# Patient Record
Sex: FEMALE | Race: WHITE | Employment: FULL TIME | URBAN - METROPOLITAN AREA
[De-identification: names, ages, dates, MRNs, and addresses within clinical notes are randomized per-mention and may not be internally consistent; named-entity substitution may affect disease eponyms.]

---

## 2017-04-05 PROBLEM — M25.361 INSTABILITY OF RIGHT KNEE JOINT: Status: ACTIVE | Noted: 2017-04-05

## 2021-04-09 LAB
ALBUMIN SERPL-MCNC: NORMAL G/DL
ALP BLD-CCNC: NORMAL U/L
ALT SERPL-CCNC: NORMAL U/L
ANION GAP SERPL CALCULATED.3IONS-SCNC: NORMAL MMOL/L
AST SERPL-CCNC: NORMAL U/L
AVERAGE GLUCOSE: 117
BILIRUB SERPL-MCNC: NORMAL MG/DL
BUN BLDV-MCNC: NORMAL MG/DL
CALCIUM SERPL-MCNC: NORMAL MG/DL
CHLORIDE BLD-SCNC: NORMAL MMOL/L
CHOLESTEROL, TOTAL: NORMAL
CHOLESTEROL/HDL RATIO: NORMAL
CO2: NORMAL
CREAT SERPL-MCNC: NORMAL MG/DL
GFR CALCULATED: NORMAL
GLUCOSE BLD-MCNC: NORMAL MG/DL
HBA1C MFR BLD: 5.7 %
HDLC SERPL-MCNC: NORMAL MG/DL
LDL CHOLESTEROL CALCULATED: NORMAL
NONHDLC SERPL-MCNC: NORMAL MG/DL
POTASSIUM SERPL-SCNC: NORMAL MMOL/L
SODIUM BLD-SCNC: NORMAL MMOL/L
TOTAL PROTEIN: NORMAL
TRIGL SERPL-MCNC: NORMAL MG/DL
VITAMIN D 25-HYDROXY: NORMAL
VITAMIN D2, 25 HYDROXY: NORMAL
VITAMIN D3,25 HYDROXY: NORMAL
VLDLC SERPL CALC-MCNC: NORMAL MG/DL

## 2021-05-12 ENCOUNTER — APPOINTMENT (OUTPATIENT)
Dept: GENERAL RADIOLOGY | Age: 61
DRG: 177 | End: 2021-05-12
Payer: COMMERCIAL

## 2021-05-12 LAB
ALBUMIN SERPL-MCNC: 2.8 G/DL (ref 3.5–5.2)
ALP BLD-CCNC: 53 U/L (ref 35–104)
ALT SERPL-CCNC: 55 U/L (ref 0–32)
ANION GAP SERPL CALCULATED.3IONS-SCNC: 14 MMOL/L (ref 7–16)
AST SERPL-CCNC: 60 U/L (ref 0–31)
BASOPHILS ABSOLUTE: 0.01 E9/L (ref 0–0.2)
BASOPHILS RELATIVE PERCENT: 0.1 % (ref 0–2)
BILIRUB SERPL-MCNC: 0.4 MG/DL (ref 0–1.2)
BUN BLDV-MCNC: 19 MG/DL (ref 6–23)
CALCIUM SERPL-MCNC: 8.7 MG/DL (ref 8.6–10.2)
CHLORIDE BLD-SCNC: 101 MMOL/L (ref 98–107)
CO2: 22 MMOL/L (ref 22–29)
CREAT SERPL-MCNC: 0.9 MG/DL (ref 0.5–1)
EOSINOPHILS ABSOLUTE: 0.02 E9/L (ref 0.05–0.5)
EOSINOPHILS RELATIVE PERCENT: 0.2 % (ref 0–6)
GFR AFRICAN AMERICAN: >60
GFR NON-AFRICAN AMERICAN: >60 ML/MIN/1.73
GLUCOSE BLD-MCNC: 116 MG/DL (ref 74–99)
HCT VFR BLD CALC: 42 % (ref 34–48)
HEMOGLOBIN: 13.4 G/DL (ref 11.5–15.5)
IMMATURE GRANULOCYTES #: 0.07 E9/L
IMMATURE GRANULOCYTES %: 0.8 % (ref 0–5)
INR BLD: 1.3
LYMPHOCYTES ABSOLUTE: 1.48 E9/L (ref 1.5–4)
LYMPHOCYTES RELATIVE PERCENT: 16.4 % (ref 20–42)
MCH RBC QN AUTO: 30 PG (ref 26–35)
MCHC RBC AUTO-ENTMCNC: 31.9 % (ref 32–34.5)
MCV RBC AUTO: 94 FL (ref 80–99.9)
MONOCYTES ABSOLUTE: 0.56 E9/L (ref 0.1–0.95)
MONOCYTES RELATIVE PERCENT: 6.2 % (ref 2–12)
NEUTROPHILS ABSOLUTE: 6.86 E9/L (ref 1.8–7.3)
NEUTROPHILS RELATIVE PERCENT: 76.3 % (ref 43–80)
PDW BLD-RTO: 13.2 FL (ref 11.5–15)
PLATELET # BLD: 323 E9/L (ref 130–450)
PMV BLD AUTO: 10.1 FL (ref 7–12)
POTASSIUM SERPL-SCNC: 3.8 MMOL/L (ref 3.5–5)
PRO-BNP: 49 PG/ML (ref 0–125)
PROTHROMBIN TIME: 14.7 SEC (ref 9.3–12.4)
RBC # BLD: 4.47 E12/L (ref 3.5–5.5)
SARS-COV-2, NAAT: DETECTED
SODIUM BLD-SCNC: 137 MMOL/L (ref 132–146)
TOTAL PROTEIN: 8 G/DL (ref 6.4–8.3)
TROPONIN: <0.01 NG/ML (ref 0–0.03)
WBC # BLD: 9 E9/L (ref 4.5–11.5)

## 2021-05-12 PROCEDURE — 87635 SARS-COV-2 COVID-19 AMP PRB: CPT

## 2021-05-12 PROCEDURE — 83880 ASSAY OF NATRIURETIC PEPTIDE: CPT

## 2021-05-12 PROCEDURE — 84484 ASSAY OF TROPONIN QUANT: CPT

## 2021-05-12 PROCEDURE — 85610 PROTHROMBIN TIME: CPT

## 2021-05-12 PROCEDURE — 85025 COMPLETE CBC W/AUTO DIFF WBC: CPT

## 2021-05-12 PROCEDURE — 80053 COMPREHEN METABOLIC PANEL: CPT

## 2021-05-12 PROCEDURE — 99284 EMERGENCY DEPT VISIT MOD MDM: CPT

## 2021-05-12 PROCEDURE — 71045 X-RAY EXAM CHEST 1 VIEW: CPT

## 2021-05-12 PROCEDURE — 93005 ELECTROCARDIOGRAM TRACING: CPT | Performed by: NURSE PRACTITIONER

## 2021-05-13 ENCOUNTER — HOSPITAL ENCOUNTER (INPATIENT)
Age: 61
LOS: 5 days | Discharge: HOME | DRG: 177 | End: 2021-05-18
Attending: EMERGENCY MEDICINE | Admitting: INTERNAL MEDICINE
Payer: COMMERCIAL

## 2021-05-13 ENCOUNTER — APPOINTMENT (OUTPATIENT)
Dept: CT IMAGING | Age: 61
DRG: 177 | End: 2021-05-13
Payer: COMMERCIAL

## 2021-05-13 DIAGNOSIS — U07.1 COVID-19: ICD-10-CM

## 2021-05-13 DIAGNOSIS — J96.01 ACUTE RESPIRATORY FAILURE WITH HYPOXIA (HCC): Primary | ICD-10-CM

## 2021-05-13 LAB
D DIMER: 913 NG/ML DDU
EKG ATRIAL RATE: 88 BPM
EKG P AXIS: 60 DEGREES
EKG P-R INTERVAL: 122 MS
EKG Q-T INTERVAL: 340 MS
EKG QRS DURATION: 72 MS
EKG QTC CALCULATION (BAZETT): 411 MS
EKG R AXIS: 63 DEGREES
EKG T AXIS: 17 DEGREES
EKG VENTRICULAR RATE: 88 BPM

## 2021-05-13 PROCEDURE — 2700000000 HC OXYGEN THERAPY PER DAY

## 2021-05-13 PROCEDURE — 2580000003 HC RX 258: Performed by: INTERNAL MEDICINE

## 2021-05-13 PROCEDURE — 2580000003 HC RX 258: Performed by: EMERGENCY MEDICINE

## 2021-05-13 PROCEDURE — 93010 ELECTROCARDIOGRAM REPORT: CPT | Performed by: INTERNAL MEDICINE

## 2021-05-13 PROCEDURE — 85378 FIBRIN DEGRADE SEMIQUANT: CPT

## 2021-05-13 PROCEDURE — 6360000002 HC RX W HCPCS: Performed by: EMERGENCY MEDICINE

## 2021-05-13 PROCEDURE — 2580000003 HC RX 258: Performed by: RADIOLOGY

## 2021-05-13 PROCEDURE — 2140000000 HC CCU INTERMEDIATE R&B

## 2021-05-13 PROCEDURE — 71275 CT ANGIOGRAPHY CHEST: CPT

## 2021-05-13 PROCEDURE — 6360000004 HC RX CONTRAST MEDICATION: Performed by: RADIOLOGY

## 2021-05-13 PROCEDURE — 6360000002 HC RX W HCPCS: Performed by: INTERNAL MEDICINE

## 2021-05-13 PROCEDURE — 96374 THER/PROPH/DIAG INJ IV PUSH: CPT

## 2021-05-13 RX ORDER — SODIUM CHLORIDE 0.9 % (FLUSH) 0.9 %
5-40 SYRINGE (ML) INJECTION EVERY 12 HOURS SCHEDULED
Status: DISCONTINUED | OUTPATIENT
Start: 2021-05-13 | End: 2021-05-18 | Stop reason: HOSPADM

## 2021-05-13 RX ORDER — DEXAMETHASONE SODIUM PHOSPHATE 10 MG/ML
6 INJECTION INTRAMUSCULAR; INTRAVENOUS ONCE
Status: COMPLETED | OUTPATIENT
Start: 2021-05-13 | End: 2021-05-13

## 2021-05-13 RX ORDER — SODIUM CHLORIDE 9 MG/ML
25 INJECTION, SOLUTION INTRAVENOUS PRN
Status: DISCONTINUED | OUTPATIENT
Start: 2021-05-13 | End: 2021-05-18 | Stop reason: HOSPADM

## 2021-05-13 RX ORDER — ACETAMINOPHEN 325 MG/1
650 TABLET ORAL EVERY 4 HOURS PRN
Status: DISCONTINUED | OUTPATIENT
Start: 2021-05-13 | End: 2021-05-18 | Stop reason: HOSPADM

## 2021-05-13 RX ORDER — SODIUM CHLORIDE 0.9 % (FLUSH) 0.9 %
5-40 SYRINGE (ML) INJECTION PRN
Status: DISCONTINUED | OUTPATIENT
Start: 2021-05-13 | End: 2021-05-18 | Stop reason: HOSPADM

## 2021-05-13 RX ORDER — 0.9 % SODIUM CHLORIDE 0.9 %
1000 INTRAVENOUS SOLUTION INTRAVENOUS ONCE
Status: COMPLETED | OUTPATIENT
Start: 2021-05-13 | End: 2021-05-13

## 2021-05-13 RX ORDER — SODIUM CHLORIDE 0.9 % (FLUSH) 0.9 %
10 SYRINGE (ML) INJECTION PRN
Status: COMPLETED | OUTPATIENT
Start: 2021-05-13 | End: 2021-05-13

## 2021-05-13 RX ADMIN — IOPAMIDOL 75 ML: 755 INJECTION, SOLUTION INTRAVENOUS at 03:08

## 2021-05-13 RX ADMIN — Medication 10 ML: at 03:08

## 2021-05-13 RX ADMIN — DEXAMETHASONE SODIUM PHOSPHATE 6 MG: 10 INJECTION INTRAMUSCULAR; INTRAVENOUS at 01:46

## 2021-05-13 RX ADMIN — ENOXAPARIN SODIUM 40 MG: 40 INJECTION SUBCUTANEOUS at 11:23

## 2021-05-13 RX ADMIN — SODIUM CHLORIDE 1000 ML: 9 INJECTION, SOLUTION INTRAVENOUS at 01:45

## 2021-05-13 RX ADMIN — SODIUM CHLORIDE, PRESERVATIVE FREE 10 ML: 5 INJECTION INTRAVENOUS at 21:34

## 2021-05-13 RX ADMIN — SODIUM CHLORIDE, PRESERVATIVE FREE 10 ML: 5 INJECTION INTRAVENOUS at 11:23

## 2021-05-13 NOTE — CARE COORDINATION
Call placed into the room due to COVID (+). Pt lives at home alone. No assistive devices. She tells me she does not have home O2 or a nebulizer. She stated she is independent with ADL's. Will have therapy eval pt to assist with any discharge needs. Pt will need ambulation testing prior to discharge. She is currently on 5 liters. Not a candidate for Remdesivir.  CM to follow    Quinten MILLERN, RN  [facility]  [phone]

## 2021-05-13 NOTE — H&P
Cordelia Corado is a 56 y.o. female presenting to the ED for Shortness of breath, beginning 1 week ago. The patient was diagnosed with COVID-19 on . Since then she has had increasing shortness of breath. Per the patient's daughter she has been sleeping a lot having decreased appetite and running intermittent fevers. Today they went to urgent care to get another test to see if she was able to go back to work but she was having increasing shortness of breath and on her oxygen saturations was noted to be 88% therefore she was sent to ER  for further evaluation. Denies any chest pain. No nausea vomiting diarrhea. No abdominal pain. Past Medical History:   Diagnosis Date    Arthritis of back     Hand pain     Knee pain, right     Pneumonia        Past Surgical History:   Procedure Laterality Date     SECTION      x1       History reviewed. No pertinent family history. Prior to Admission medications    Medication Sig Start Date End Date Taking? Authorizing Provider   Multiple Vitamins-Minerals (THERAPEUTIC MULTIVITAMIN-MINERALS) tablet Take 1 tablet by mouth daily    Historical Provider, MD   naproxen (NAPROSYN) 500 MG tablet Take 1 tablet by mouth 2 times daily as needed for Pain 3/25/17   TERE Holm - CNP        Allergies: Patient has no known allergies. Social History     Tobacco Use    Smoking status: Never Smoker    Smokeless tobacco: Never Used   Substance Use Topics    Alcohol use: No        Review of Systems:  Respiratory: pos.  Cough and SOB   Cardiovascular: negative for chest pain and dyspnea  Gastrointestinal: negative for abdominal pain, diarrhea, nausea and vomiting  Genitourinary:negative for dysuria and hematuria  Derm: negative for rash and skin lesion(s)  Neurological: negative for seizures and tremors  Endocrine: negative for diabetic symptoms including polydipsia and polyuria    Physical Exam:  Vitals:    21 0734   BP: 92/64   Pulse: 78   Resp: 18 Temp: 97.6 °F (36.4 °C)   SpO2: (!) 78%      Skin:  Warm and dry. No rash or bruises  HEENT:  PERRLA, EOMI  Neck:  No JVD, No thyromegaly, No carotid bruit  Cardiac:  RRR, No gallop or murmur  Lungs:  CTA, Normal percussion  Abdomen: Normal bowel sounds, no HSM, non-tender  Extremities:  No clubbing, edema or cyanosis  Neurological:  Moves all extremities    Labs:    CBC with Differential:    Lab Results   Component Value Date    WBC 9.0 05/12/2021    RBC 4.47 05/12/2021    HGB 13.4 05/12/2021    HCT 42.0 05/12/2021     05/12/2021    MCV 94.0 05/12/2021    MCH 30.0 05/12/2021    MCHC 31.9 05/12/2021    RDW 13.2 05/12/2021    LYMPHOPCT 16.4 05/12/2021    MONOPCT 6.2 05/12/2021    BASOPCT 0.1 05/12/2021    MONOSABS 0.56 05/12/2021    LYMPHSABS 1.48 05/12/2021    EOSABS 0.02 05/12/2021    BASOSABS 0.01 05/12/2021     CMP:    Lab Results   Component Value Date     05/12/2021    K 3.8 05/12/2021     05/12/2021    CO2 22 05/12/2021    BUN 19 05/12/2021    CREATININE 0.9 05/12/2021    GFRAA >60 05/12/2021    LABGLOM >60 05/12/2021    GLUCOSE 116 05/12/2021    GLUCOSE 90 06/07/2011    PROT 8.0 05/12/2021    LABALBU 2.8 05/12/2021    LABALBU 4.2 06/07/2011    CALCIUM 8.7 05/12/2021    BILITOT 0.4 05/12/2021    ALKPHOS 53 05/12/2021    AST 60 05/12/2021    ALT 55 05/12/2021      Imaging:CTA chest :  No evidence of pulmonary embolism.       Extensive scattered areas of patchy and ground-glass opacities are noted   throughout the bilateral lungs. These are nonspecific but could indicate an   infectious/inflammatory process.            Assessment and Plan:    Patient Active Problem List   Diagnosis    COVID-19 pneumonia  Hypoxia due to above

## 2021-05-14 PROCEDURE — 97535 SELF CARE MNGMENT TRAINING: CPT

## 2021-05-14 PROCEDURE — 2580000003 HC RX 258: Performed by: INTERNAL MEDICINE

## 2021-05-14 PROCEDURE — 97530 THERAPEUTIC ACTIVITIES: CPT

## 2021-05-14 PROCEDURE — 2700000000 HC OXYGEN THERAPY PER DAY

## 2021-05-14 PROCEDURE — 2140000000 HC CCU INTERMEDIATE R&B

## 2021-05-14 PROCEDURE — 6360000002 HC RX W HCPCS: Performed by: INTERNAL MEDICINE

## 2021-05-14 PROCEDURE — 97165 OT EVAL LOW COMPLEX 30 MIN: CPT

## 2021-05-14 RX ORDER — DEXAMETHASONE SODIUM PHOSPHATE 4 MG/ML
6 INJECTION, SOLUTION INTRA-ARTICULAR; INTRALESIONAL; INTRAMUSCULAR; INTRAVENOUS; SOFT TISSUE EVERY 24 HOURS
Status: COMPLETED | OUTPATIENT
Start: 2021-05-14 | End: 2021-05-17

## 2021-05-14 RX ADMIN — SODIUM CHLORIDE, PRESERVATIVE FREE 10 ML: 5 INJECTION INTRAVENOUS at 10:11

## 2021-05-14 RX ADMIN — ENOXAPARIN SODIUM 40 MG: 40 INJECTION SUBCUTANEOUS at 10:11

## 2021-05-14 RX ADMIN — DEXAMETHASONE SODIUM PHOSPHATE 6 MG: 4 INJECTION, SOLUTION INTRAMUSCULAR; INTRAVENOUS at 10:11

## 2021-05-14 RX ADMIN — SODIUM CHLORIDE, PRESERVATIVE FREE 10 ML: 5 INJECTION INTRAVENOUS at 21:18

## 2021-05-14 ASSESSMENT — PAIN SCALES - GENERAL: PAINLEVEL_OUTOF10: 0

## 2021-05-14 NOTE — PLAN OF CARE
Problem: Airway Clearance - Ineffective  Goal: Achieve or maintain patent airway  Outcome: Ongoing     Problem: Gas Exchange - Impaired  Goal: Absence of hypoxia  Outcome: Ongoing  Goal: Promote optimal lung function  Outcome: Ongoing     Problem: Breathing Pattern - Ineffective  Goal: Ability to achieve and maintain a regular respiratory rate  Outcome: Ongoing     Problem:  Body Temperature -  Risk of, Imbalanced  Goal: Ability to maintain a body temperature within defined limits  Outcome: Ongoing  Goal: Will regain or maintain usual level of consciousness  Outcome: Ongoing  Goal: Complications related to the disease process, condition or treatment will be avoided or minimized  Outcome: Ongoing     Problem: Isolation Precautions - Risk of Spread of Infection  Goal: Prevent transmission of infection  Outcome: Ongoing     Problem: Nutrition Deficits  Goal: Optimize nutritional status  Outcome: Ongoing     Problem: Risk for Fluid Volume Deficit  Goal: Maintain normal heart rhythm  Outcome: Ongoing  Goal: Maintain absence of muscle cramping  Outcome: Ongoing  Goal: Maintain normal serum potassium, sodium, calcium, phosphorus, and pH  Outcome: Ongoing     Problem: Loneliness or Risk for Loneliness  Goal: Demonstrate positive use of time alone when socialization is not possible  Outcome: Ongoing     Problem: Fatigue  Goal: Verbalize increase energy and improved vitality  Outcome: Ongoing     Problem: Patient Education: Go to Patient Education Activity  Goal: Patient/Family Education  Outcome: Ongoing     Problem: Pain:  Goal: Pain level will decrease  Description: Pain level will decrease  Outcome: Ongoing  Goal: Control of acute pain  Description: Control of acute pain  Outcome: Ongoing  Goal: Control of chronic pain  Description: Control of chronic pain  Outcome: Ongoing

## 2021-05-14 NOTE — PROGRESS NOTES
Occupational Therapy  OCCUPATIONAL THERAPY INITIAL EVALUATION      Date:2021  Patient Name: Yvon Reyna  MRN: [id number]  : 1960  Room: [address] [place] [address]    Referring Provider:  Hernan Chaidez MD    Evaluating OT: Leonela Perez OTR/L #[id number]     AM-PAC Daily Activity Raw Score:     Recommended Adaptive Equipment: TBD     Diagnosis: COVID     Patient presented to the hospital d/t SOB     Pertinent Medical History:    Past Medical History:   Diagnosis Date    Arthritis of back     Hand pain     Knee pain, right     Pneumonia       Precautions:  Falls, Droplet plus (COVID-19), O2, Burkin[place] Speaking     Home Living: Pt lives with son and grandson in a 2 story home with 10-12 OZ to enter (2HR). Full  Flight to second floor (2 HR) with 2nd floor set-up  Bathroom setup: [address] owned: none    Prior Level of Function: Indepndent with ADLs , Independent with IADLs; ambulated without AD  Driving: Yes  Occupation: car     Pain Level: Pt reports no pain    Cognition: A&O: 4/4; Follows 2 step directions   Memory:  Good   Sequencing:  F+   Problem solving:  Fair   Judgement/safety:  Fair     Functional Assessment:   Initial Eval Status  Date: 21 Treatment Status  Date: Short Term Goals = Long Term Goals  Treatment frequency: 1-4x/wk; PRN   Feeding Independent       Grooming Stand by Assist     Standing  Independent    UB Dressing Stand by Assist     Seated EOB  Independent    LB Dressing Stand by Assist   Independent    Bathing Stand by Assist  Independent    Toileting Stand by Assist   Independent    Bed Mobility  Supine to sit: Stand by Assist   Sit to supine: NT  Supine to sit:  Independent   Sit to supine: Independent    Functional Transfers Stand by Assist     Sit <>Stands without AD  Independent    Functional Mobility Stand by Assist     Functional ambulation without AD to chair in preparation to walk to bathroom  Independent    Balance Sitting:     Static:  Independent Dynamic:SBA  Standing: SBA  Sitting:     Dynamic:Independent  Standing: Independent   Activity Tolerance Fair  Good   Visual/  Perceptual Glasses: yes WFL                  Vitals:  5L/O2  Rest: O2 sat 94%  Seated eob: O2 sat 91%  1 min resaturate  to 93%  ADL: O2 sat 88%, HR 97 bpm  resat to 93% in 1 minute  Short ambulation task in room: O2 sat 89%  resat to 93% in 1 minute      Hand dominance: right     Strength ROM Additional Info:    RUE  4+/5  WFL good  and wfl FMC/dexterity noted during ADL tasks       LUE 4+/5  WFL good  and wfl FMC/dexterity noted during ADL tasks         [facility]   Sensation:  No c/o numbness or tingling  Tone: WFL   Edema: WFL                            Comments: Nursing clearance obtained prior to session. Upon arrival, patient supine in bed and agreeable to OT session. Therapist utilized  throughout session. Pt demonstrating F- understanding of education/techniques, requiring additional training / education. At end of session, patient seated in chair with call light and phone within reach, all lines intact. Pt would benefit from continued skilled OT to increase safety,  independence and quality of life. Treatment:  OT services provided: Therapist educated pt on role of OT and O2 sats. Therapist facilitated bed mobility (supine to sit, scooting), functional transfers from multiple surfaces (sit<>stands to/from bed/chair), graded functional activities (static/dynamic sitting at EOB/chair, static/dynamic standing, functional reaching) to address activity tolerance, and functional ambulation without AD (from bed to chair in preparation for ambulation to bathroom). Therapist provided minimal verbal cueing on hand placement, body mechanics, posture, energy conservation, breathing techniques, and safety. Therapist facilitated ADL retraining (UB/LB dressing (gown, don/doff sock) and multiple grooming task (including B wiping hands while standing).  Therapist educated pt on energy conservation, breathing techniques, and safety. Skilled monitoring of O2 sats, HR, and pt response throughout treatment (see above - cueing on pursed lip breathing / energy conservation techniques). Assessment of current deficits    Functional mobility [x]  ADLs [x] Strength [x]  Cognition []  Functional transfers  [x] IADLs [x] Safety Awareness [x]  Endurance [x]  Fine Motor Coordination [] Balance [x] Vision/perception [] Sensation []   Gross Motor Coordination [] ROM [] Delirium []                  Motor Control []      Plan of Care:  1-4 days/wk for 1-2 weeks PRN   Instruction/training on adapted ADL techniques and AE recommendations to increase functional independence within precautions  Training on energy conservation strategies/techniques to improve independence/tolerance for self-care routine  Functional transfer/mobility training/DME recommendations for increased independence, safety, and fall prevention  Patient/Family education to increase follow through with safety techniques and functional independence  Recommendation of environmental modifications for increased safety with functional transfers/mobility and ADLs  Therapeutic exercise to improve motor endurance, ROM, and functional strength for ADLs/functional transfers  Therapeutic activities to facilitate/challenge dynamic balance, stand tolerance, fine motor dexterity/in-hand manipulation for increased independence with ADLs      Rehab Potential: Good for established goals     Patient / Family Goal: not stated      Patient and/or family were instructed on functional diagnosis, prognosis/goals and OT plan of care. Demonstrated F- understanding.       AM-PAC Daily Activity Inpatient   How much help for putting on and taking off regular lower body clothing?: A Little  How much help for Bathing?: A Little  How much help for Toileting?: A Little  How much help for putting on and taking off regular upper body clothing?: A Little  How much help for taking care of personal grooming?: A Little  How much help for eating meals?: None  AM-PAC Inpatient Daily Activity Raw Score: 19  AM-PAC Inpatient ADL T-Scale Score : 40.22  ADL Inpatient CMS 0-100% Score: 42.8  ADL Inpatient CMS G-Code Modifier : CK          Eval Complexity: Low complexity evaluation performed due to occupational profile / review of medical history and assessment of functional performance skills. Time In: 0945  Time Out: 1020  Total Treatment Time: 23 minutes    Min Units   OT Eval Low 97165  X  1   OT Eval Medium 83536      OT Eval High 71837       OT Re-Eval [id number]       Therapeutic Ex 61658       Therapeutic Activities 01887  15  1   ADL/Self Care 25637  8  1   Orthotic Management 23904       Neuro Re-Ed 53529       Non-Billable Time          Evaluation Time includes thorough review of current medical information, gathering information on past medical history/social history and prior level of function, completion of standardized testing/informal observation of tasks, assessment of data and education on plan of care and goals.                Janae Browne, S/OT  Fransisco Sapp OTR/L #[id number]

## 2021-05-14 NOTE — PROGRESS NOTES
Admit Date: 5/13/2021    Subjective:     Feels better breathing easier     Objective:     No data found. I/O last 3 completed shifts: In: 480 [P.O.:480]  Out: -   No intake/output data recorded. HEENT: Normal  NECK: Thyroid normal. No carotid bruit. No lymphphadenopathy. CVS: RRR  RS: Clear. No wheeze. No rhonchi. ABD: Soft. Non tender. No mass. Normal BS.obese   EXT: No edema. Non tender. Pulses present. Skin intact.   NEURO: no focal deficit       Scheduled Meds:   sodium chloride flush  5-40 mL Intravenous 2 times per day    enoxaparin  40 mg Subcutaneous Daily     Continuous Infusions:   sodium chloride         CBC with Differential:    Lab Results   Component Value Date    WBC 9.0 05/12/2021    RBC 4.47 05/12/2021    HGB 13.4 05/12/2021    HCT 42.0 05/12/2021     05/12/2021    MCV 94.0 05/12/2021    MCH 30.0 05/12/2021    MCHC 31.9 05/12/2021    RDW 13.2 05/12/2021    LYMPHOPCT 16.4 05/12/2021    MONOPCT 6.2 05/12/2021    BASOPCT 0.1 05/12/2021    MONOSABS 0.56 05/12/2021    LYMPHSABS 1.48 05/12/2021    EOSABS 0.02 05/12/2021    BASOSABS 0.01 05/12/2021     CMP:    Lab Results   Component Value Date     05/12/2021    K 3.8 05/12/2021     05/12/2021    CO2 22 05/12/2021    BUN 19 05/12/2021    CREATININE 0.9 05/12/2021    GFRAA >60 05/12/2021    LABGLOM >60 05/12/2021    PROT 8.0 05/12/2021    LABALBU 2.8 05/12/2021    LABALBU 4.2 06/07/2011    CALCIUM 8.7 05/12/2021    BILITOT 0.4 05/12/2021    ALKPHOS 53 05/12/2021    AST 60 05/12/2021    ALT 55 05/12/2021     PT/INR:    Lab Results   Component Value Date    PROTIME 14.7 05/12/2021    PROTIME 11.8 06/07/2011    INR 1.3 05/12/2021       Assessment:     Active Problems:    COVID-19 pneumonia       Plan:   Continue remdesivir ,add steroid ,wean O2 as tolerated

## 2021-05-14 NOTE — PLAN OF CARE
Matilda Barajas RN  Outcome: Ongoing  Goal: Maintain absence of muscle cramping  5/14/2021 1154 by Liana López RN  Outcome: Met This Shift  5/14/2021 0033 by Matilda Barajas RN  Outcome: Ongoing  Goal: Maintain normal serum potassium, sodium, calcium, phosphorus, and pH  5/14/2021 1154 by Liana López RN  Outcome: Met This Shift  5/14/2021 0033 by Matilda Barajas RN  Outcome: Ongoing     Problem: Loneliness or Risk for Loneliness  Goal: Demonstrate positive use of time alone when socialization is not possible  5/14/2021 1154 by Liana López RN  Outcome: Met This Shift  5/14/2021 0033 by Matilda Barajas RN  Outcome: Ongoing     Problem: Fatigue  Goal: Verbalize increase energy and improved vitality  5/14/2021 1154 by Liana López RN  Outcome: Met This Shift  5/14/2021 0033 by Matilda Barajas RN  Outcome: Ongoing     Problem: Patient Education: Go to Patient Education Activity  Goal: Patient/Family Education  5/14/2021 1154 by Liana López RN  Outcome: Met This Shift  5/14/2021 0033 by Matilda Barajas RN  Outcome: Ongoing     Problem: Pain:  Goal: Pain level will decrease  Description: Pain level will decrease  5/14/2021 1154 by Liana López RN  Outcome: Met This Shift  5/14/2021 0033 by Matilda Barajas RN  Outcome: Ongoing  Goal: Control of acute pain  Description: Control of acute pain  5/14/2021 1154 by Liana López RN  Outcome: Met This Shift  5/14/2021 0033 by Matilda Barajas RN  Outcome: Ongoing  Goal: Control of chronic pain  Description: Control of chronic pain  5/14/2021 1154 by Liana López RN  Outcome: Met This Shift  5/14/2021 0033 by Matilda Barajas RN  Outcome: Ongoing  Goal: Patient's pain/discomfort is manageable  Description: Patient's pain/discomfort is manageable  Outcome: Met This Shift     Problem: Falls - Risk of:  Goal: Will remain free from falls  Description: Will remain free from falls  Outcome: Met This Shift  Goal: Absence of physical injury  Description: Absence of physical injury  Outcome: Met This Shift

## 2021-05-14 NOTE — PROGRESS NOTES
Pt O2 drops when she ambulates to the restroom, she takes her O2 off and drops to low 82%.   A 25ft NC was placed in pt room and pt was instructed to walk to bathroom while leaving O2 on

## 2021-05-15 PROCEDURE — 2580000003 HC RX 258: Performed by: INTERNAL MEDICINE

## 2021-05-15 PROCEDURE — 2580000003 HC RX 258: Performed by: NEUROMUSCULOSKELETAL MEDICINE & OMM

## 2021-05-15 PROCEDURE — 93005 ELECTROCARDIOGRAM TRACING: CPT | Performed by: INTERNAL MEDICINE

## 2021-05-15 PROCEDURE — 2140000000 HC CCU INTERMEDIATE R&B

## 2021-05-15 PROCEDURE — 6360000002 HC RX W HCPCS: Performed by: INTERNAL MEDICINE

## 2021-05-15 PROCEDURE — 2700000000 HC OXYGEN THERAPY PER DAY

## 2021-05-15 PROCEDURE — 6370000000 HC RX 637 (ALT 250 FOR IP): Performed by: INTERNAL MEDICINE

## 2021-05-15 RX ORDER — SODIUM CHLORIDE 9 MG/ML
INJECTION, SOLUTION INTRAVENOUS CONTINUOUS
Status: DISCONTINUED | OUTPATIENT
Start: 2021-05-15 | End: 2021-05-17

## 2021-05-15 RX ADMIN — SODIUM CHLORIDE, PRESERVATIVE FREE 10 ML: 5 INJECTION INTRAVENOUS at 09:04

## 2021-05-15 RX ADMIN — DEXAMETHASONE SODIUM PHOSPHATE 6 MG: 4 INJECTION, SOLUTION INTRAMUSCULAR; INTRAVENOUS at 09:04

## 2021-05-15 RX ADMIN — ENOXAPARIN SODIUM 40 MG: 40 INJECTION SUBCUTANEOUS at 09:03

## 2021-05-15 RX ADMIN — ACETAMINOPHEN 650 MG: 325 TABLET ORAL at 21:54

## 2021-05-15 RX ADMIN — SODIUM CHLORIDE: 9 INJECTION, SOLUTION INTRAVENOUS at 11:15

## 2021-05-15 ASSESSMENT — PAIN SCALES - GENERAL
PAINLEVEL_OUTOF10: 0
PAINLEVEL_OUTOF10: 0

## 2021-05-15 NOTE — PLAN OF CARE
pain/discomfort is manageable  Description: Patient's pain/discomfort is manageable  Outcome: Met This Shift     Problem: Falls - Risk of:  Goal: Will remain free from falls  Description: Will remain free from falls  Outcome: Met This Shift  Goal: Absence of physical injury  Description: Absence of physical injury  Outcome: Met This Shift     Problem: Infection:  Goal: Will remain free from infection  Description: Will remain free from infection  Outcome: Met This Shift     Problem: Safety:  Goal: Free from accidental physical injury  Description: Free from accidental physical injury  Outcome: Met This Shift  Goal: Free from intentional harm  Description: Free from intentional harm  Outcome: Met This Shift     Problem: Daily Care:  Goal: Daily care needs are met  Description: Daily care needs are met  Outcome: Met This Shift     Problem: Skin Integrity:  Goal: Skin integrity will stabilize  Description: Skin integrity will stabilize  Outcome: Met This Shift     Problem: Discharge Planning:  Goal: Patients continuum of care needs are met  Description: Patients continuum of care needs are met  Outcome: Met This Shift

## 2021-05-15 NOTE — PLAN OF CARE
Problem: Airway Clearance - Ineffective  Goal: Achieve or maintain patent airway  Outcome: Met This Shift     Problem: Breathing Pattern - Ineffective  Goal: Ability to achieve and maintain a regular respiratory rate  Outcome: Met This Shift     Problem:  Body Temperature -  Risk of, Imbalanced  Goal: Complications related to the disease process, condition or treatment will be avoided or minimized  Outcome: Met This Shift

## 2021-05-15 NOTE — PROGRESS NOTES
Results   Component Value Date    PROTIME 14.7 05/12/2021    PROTIME 11.8 06/07/2011    INR 1.3 05/12/2021     Last 3 Troponin:    Lab Results   Component Value Date    TROPONINI <0.01 05/12/2021    TROPONINI <0.01 08/02/2015    TROPONINI <0.01 06/07/2011     U/A:    Lab Results   Component Value Date    COLORU Yellow 01/03/2018    PHUR 5.0 01/03/2018    WBCUA 1-3 01/03/2018    RBCUA 1-3 01/03/2018    BACTERIA MODERATE 01/03/2018    CLARITYU SL CLOUDY 01/03/2018    SPECGRAV >=1.030 01/03/2018    LEUKOCYTESUR Negative 01/03/2018    UROBILINOGEN 0.2 01/03/2018    BILIRUBINUR Negative 01/03/2018    BLOODU SMALL 01/03/2018    GLUCOSEU Negative 01/03/2018     HgBA1c:  No components found for: HGBA1C  TSH:  No results found for: TSH  -----------------------------------------------------------------    Objective:   Vitals: /67   Pulse 63   Temp 97.3 °F (36.3 °C) (Infrared)   Resp 18   Ht 5' 8\" (1.727 m)   Wt 212 lb (96.2 kg)   SpO2 95%   BMI 32.23 kg/m²   General appearance: alert, appears stated age and cooperative  Skin: Skin color, texture, turgor normal. No rashes or lesions  HEENT: Head: Normal, normocephalic, atraumatic.   Neck: no adenopathy, no carotid bruit, no JVD, supple, symmetrical, trachea midline and thyroid not enlarged, symmetric, no tenderness/mass/nodules  Lungs: diminished breath sounds bibasilar  Heart: regular rate and rhythm, S1, S2 normal, no murmur, click, rub or gallop  Abdomen: soft, non-tender; bowel sounds normal; no masses,  no organomegaly  Extremities: extremities normal, atraumatic, no cyanosis or edema  Neurologic: Mental status: Alert, oriented, thought content appropriate        CTA PULMONARY W CONTRAST [[id number]] Collected: 05/13/21 0327      Order Status: Completed Updated: 05/13/21 0347     Narrative:       EXAMINATION:   CTA OF THE CHEST 5/13/2021 3:06 am     TECHNIQUE:   CTA of the chest was performed after the administration of intravenous   contrast.  Multiplanar reformatted images are provided for review.  MIP   images are provided for review. Dose modulation, iterative reconstruction,   and/or weight based adjustment of the mA/kV was utilized to reduce the   radiation dose to as low as reasonably achievable. COMPARISON:   None. HISTORY:   ORDERING SYSTEM PROVIDED HISTORY: sob   TECHNOLOGIST PROVIDED HISTORY:   Reason for exam:->sob   Decision Support Exception - unselect if not a suspected or confirmed   emergency medical condition->Emergency Medical Condition (MA)   What reading provider will be dictating this exam?->CRC     FINDINGS:   Pulmonary Arteries: Pulmonary arteries are adequately opacified for   evaluation.  No evidence of intraluminal filling defect to suggest pulmonary   embolism.  Main pulmonary artery is normal in caliber. Mediastinum: No evidence of mediastinal lymphadenopathy.  The heart and   pericardium demonstrate no acute abnormality.  There is no acute abnormality   of the thoracic aorta. Lungs/pleura: Extensive scattered areas of patchy and ground-glass opacities   are noted throughout the bilateral lungs.  These are nonspecific but could   indicate an atypical/viral pneumonia, inclusive of COVID-19.  No pulmonary   mass or jazzy consolidation.  No pleural effusion. Upper Abdomen: Small hiatal hernia.  The remainder of the visualized portions   of the upper abdomen is within normal limits. Soft Tissues/Bones: No acute bone or soft tissue abnormality.      Impression:       No evidence of pulmonary embolism. Extensive scattered areas of patchy and ground-glass opacities are noted   throughout the bilateral lungs. These are nonspecific but could indicate an   infectious/inflammatory process. Assessment:   Active Problems:    COVID-19  Resolved Problems:    * No resolved hospital problems. *    Plan:   1. Continue present care. 2. Will follow in alirio Francis, D.OStephani Pacheco AM  5/15/2021

## 2021-05-16 LAB
ALBUMIN SERPL-MCNC: 2.9 G/DL (ref 3.5–5.2)
ALP BLD-CCNC: 53 U/L (ref 35–104)
ALT SERPL-CCNC: 77 U/L (ref 0–32)
ANION GAP SERPL CALCULATED.3IONS-SCNC: 11 MMOL/L (ref 7–16)
AST SERPL-CCNC: 39 U/L (ref 0–31)
BASOPHILS ABSOLUTE: 0.02 E9/L (ref 0–0.2)
BASOPHILS RELATIVE PERCENT: 0.1 % (ref 0–2)
BILIRUB SERPL-MCNC: 0.3 MG/DL (ref 0–1.2)
BUN BLDV-MCNC: 15 MG/DL (ref 6–23)
C-REACTIVE PROTEIN: 2.1 MG/DL (ref 0–0.4)
CALCIUM SERPL-MCNC: 8.9 MG/DL (ref 8.6–10.2)
CHLORIDE BLD-SCNC: 106 MMOL/L (ref 98–107)
CK MB: 1 NG/ML (ref 0–4.3)
CO2: 23 MMOL/L (ref 22–29)
CREAT SERPL-MCNC: 0.7 MG/DL (ref 0.5–1)
D DIMER: 1512 NG/ML DDU
EKG ATRIAL RATE: 56 BPM
EKG P AXIS: 12 DEGREES
EKG P-R INTERVAL: 108 MS
EKG Q-T INTERVAL: 448 MS
EKG QRS DURATION: 88 MS
EKG QTC CALCULATION (BAZETT): 432 MS
EKG R AXIS: 75 DEGREES
EKG T AXIS: 49 DEGREES
EKG VENTRICULAR RATE: 56 BPM
EOSINOPHILS ABSOLUTE: 0.03 E9/L (ref 0.05–0.5)
EOSINOPHILS RELATIVE PERCENT: 0.2 % (ref 0–6)
FERRITIN: 960 NG/ML
FIBRINOGEN: >700 MG/DL (ref 225–540)
GFR AFRICAN AMERICAN: >60
GFR NON-AFRICAN AMERICAN: >60 ML/MIN/1.73
GLUCOSE BLD-MCNC: 159 MG/DL (ref 74–99)
HCT VFR BLD CALC: 39.5 % (ref 34–48)
HEMOGLOBIN: 12.8 G/DL (ref 11.5–15.5)
IMMATURE GRANULOCYTES #: 0.15 E9/L
IMMATURE GRANULOCYTES %: 1.1 % (ref 0–5)
LYMPHOCYTES ABSOLUTE: 1.14 E9/L (ref 1.5–4)
LYMPHOCYTES RELATIVE PERCENT: 8.1 % (ref 20–42)
MCH RBC QN AUTO: 30.4 PG (ref 26–35)
MCHC RBC AUTO-ENTMCNC: 32.4 % (ref 32–34.5)
MCV RBC AUTO: 93.8 FL (ref 80–99.9)
MONOCYTES ABSOLUTE: 0.61 E9/L (ref 0.1–0.95)
MONOCYTES RELATIVE PERCENT: 4.3 % (ref 2–12)
NEUTROPHILS ABSOLUTE: 12.18 E9/L (ref 1.8–7.3)
NEUTROPHILS RELATIVE PERCENT: 86.2 % (ref 43–80)
PDW BLD-RTO: 12.9 FL (ref 11.5–15)
PLATELET # BLD: 460 E9/L (ref 130–450)
PMV BLD AUTO: 10.6 FL (ref 7–12)
POTASSIUM SERPL-SCNC: 3.9 MMOL/L (ref 3.5–5)
PROCALCITONIN: 0.07 NG/ML (ref 0–0.08)
RBC # BLD: 4.21 E12/L (ref 3.5–5.5)
SODIUM BLD-SCNC: 140 MMOL/L (ref 132–146)
TOTAL CK: 31 U/L (ref 20–180)
TOTAL PROTEIN: 7 G/DL (ref 6.4–8.3)
TROPONIN: <0.01 NG/ML (ref 0–0.03)
WBC # BLD: 14.1 E9/L (ref 4.5–11.5)

## 2021-05-16 PROCEDURE — 99255 IP/OBS CONSLTJ NEW/EST HI 80: CPT | Performed by: INTERNAL MEDICINE

## 2021-05-16 PROCEDURE — 2580000003 HC RX 258: Performed by: INTERNAL MEDICINE

## 2021-05-16 PROCEDURE — 86140 C-REACTIVE PROTEIN: CPT

## 2021-05-16 PROCEDURE — 84145 PROCALCITONIN (PCT): CPT

## 2021-05-16 PROCEDURE — 80053 COMPREHEN METABOLIC PANEL: CPT

## 2021-05-16 PROCEDURE — 2140000000 HC CCU INTERMEDIATE R&B

## 2021-05-16 PROCEDURE — 2700000000 HC OXYGEN THERAPY PER DAY

## 2021-05-16 PROCEDURE — 82553 CREATINE MB FRACTION: CPT

## 2021-05-16 PROCEDURE — 36415 COLL VENOUS BLD VENIPUNCTURE: CPT

## 2021-05-16 PROCEDURE — 82550 ASSAY OF CK (CPK): CPT

## 2021-05-16 PROCEDURE — 82728 ASSAY OF FERRITIN: CPT

## 2021-05-16 PROCEDURE — 85378 FIBRIN DEGRADE SEMIQUANT: CPT

## 2021-05-16 PROCEDURE — 93010 ELECTROCARDIOGRAM REPORT: CPT | Performed by: INTERNAL MEDICINE

## 2021-05-16 PROCEDURE — 2580000003 HC RX 258: Performed by: NEUROMUSCULOSKELETAL MEDICINE & OMM

## 2021-05-16 PROCEDURE — 6360000002 HC RX W HCPCS: Performed by: INTERNAL MEDICINE

## 2021-05-16 PROCEDURE — 84484 ASSAY OF TROPONIN QUANT: CPT

## 2021-05-16 PROCEDURE — 85025 COMPLETE CBC W/AUTO DIFF WBC: CPT

## 2021-05-16 PROCEDURE — 85384 FIBRINOGEN ACTIVITY: CPT

## 2021-05-16 RX ADMIN — DEXAMETHASONE SODIUM PHOSPHATE 6 MG: 4 INJECTION, SOLUTION INTRAMUSCULAR; INTRAVENOUS at 09:19

## 2021-05-16 RX ADMIN — SODIUM CHLORIDE: 9 INJECTION, SOLUTION INTRAVENOUS at 18:36

## 2021-05-16 RX ADMIN — SODIUM CHLORIDE, PRESERVATIVE FREE 10 ML: 5 INJECTION INTRAVENOUS at 21:42

## 2021-05-16 RX ADMIN — ENOXAPARIN SODIUM 40 MG: 40 INJECTION SUBCUTANEOUS at 09:19

## 2021-05-16 RX ADMIN — SODIUM CHLORIDE: 9 INJECTION, SOLUTION INTRAVENOUS at 00:16

## 2021-05-16 ASSESSMENT — PAIN SCALES - GENERAL
PAINLEVEL_OUTOF10: 0
PAINLEVEL_OUTOF10: 0

## 2021-05-16 NOTE — PROGRESS NOTES
Pharmacy Documentation     Medication: Tocilizumab     Date: 5/16/21  Physician: Dr. Jarad Sandoval consulted to initiate Tocilizumab. Patient does not currently meet [facility] P&T approved Tocilizumab Criteria for Use to initiate medication. Toci must be started within 7 days of symptom onset or 2 days of hospital admission to meet criteria, patient is outside both windows (symptom onset 5/4; admitted 5/13). Pharmacy will sign off. Please re-consult if the clinical condition changes and patient meets criteria for initiation based on [facility] P&T approved Tocilizumab Criteria for Use.      Nickie BarclayD 5/16/2021 2:43 PM

## 2021-05-16 NOTE — PROGRESS NOTES
Dr. Ulysses Cliche on unit and notified that the pt had an episode on chest \"tightness\" last night as documented. Orders obtained and placed at this time.

## 2021-05-16 NOTE — PROGRESS NOTES
General Progress Note  5/16/2021 9:45 AM  Subjective:   Admit Date: 5/13/2021  PCP: Randon Romberg, MD  Interval History: blood pressures with IV fluids are better. Breathing is improving slightly. She still has oxygen drops with ambulation. Awake and alert, eating breakfast currently. Transient chest tightness last evening, seem to resolve with Tylenol? Diet: DIET GENERAL;  Pain is:None  Nausea:None  Bowel Movement/Flatus yes    Data:   Scheduled Meds:   dexamethasone  6 mg Intravenous Q24H    sodium chloride flush  5-40 mL Intravenous 2 times per day    enoxaparin  40 mg Subcutaneous Daily     Continuous Infusions:   sodium chloride 75 mL/hr at 05/16/21 0016    sodium chloride       PRN Meds:sodium chloride flush, sodium chloride, acetaminophen  I/O last 3 completed shifts: In: 65 [P.O.:840; I.V.:10]  Out: 0   No intake/output data recorded.     Intake/Output Summary (Last 24 hours) at 5/16/2021 0945  Last data filed at 5/16/2021 0612  Gross per 24 hour   Intake 540 ml   Output 0 ml   Net 540 ml       CBC with Differential:    Lab Results   Component Value Date    WBC 9.0 05/12/2021    RBC 4.47 05/12/2021    HGB 13.4 05/12/2021    HCT 42.0 05/12/2021     05/12/2021    MCV 94.0 05/12/2021    MCH 30.0 05/12/2021    MCHC 31.9 05/12/2021    RDW 13.2 05/12/2021    LYMPHOPCT 16.4 05/12/2021    MONOPCT 6.2 05/12/2021    BASOPCT 0.1 05/12/2021    MONOSABS 0.56 05/12/2021    LYMPHSABS 1.48 05/12/2021    EOSABS 0.02 05/12/2021    BASOSABS 0.01 05/12/2021     CMP:    Lab Results   Component Value Date     05/12/2021    K 3.8 05/12/2021     05/12/2021    CO2 22 05/12/2021    BUN 19 05/12/2021    CREATININE 0.9 05/12/2021    GFRAA >60 05/12/2021    LABGLOM >60 05/12/2021    GLUCOSE 116 05/12/2021    GLUCOSE 90 06/07/2011    PROT 8.0 05/12/2021    LABALBU 2.8 05/12/2021    LABALBU 4.2 06/07/2011    CALCIUM 8.7 05/12/2021    BILITOT 0.4 05/12/2021    ALKPHOS 53 05/12/2021    AST 60 05/12/2021 order labs and treat accordingly. 3. Consult Pulmonary. 4. Dr. Lili Brody to see in alirio Page Her DO Aragon D.O.  9:45 AM  5/16/2021

## 2021-05-16 NOTE — PLAN OF CARE
Problem: Airway Clearance - Ineffective  Goal: Achieve or maintain patent airway  5/16/2021 0316 by Shelly Lr RN  Outcome: Met This Shift  5/15/2021 1904 by Ranulfo Salazar RN  Outcome: Met This Shift     Problem: Gas Exchange - Impaired  Goal: Absence of hypoxia  5/16/2021 0316 by Shelly Lr RN  Outcome: Met This Shift  5/15/2021 1904 by Ranulfo Salazar RN  Outcome: Met This Shift  Goal: Promote optimal lung function  5/16/2021 0316 by Shelly Lr RN  Outcome: Met This Shift  5/15/2021 1904 by Ranulfo Salazar RN  Outcome: Met This Shift     Problem: Breathing Pattern - Ineffective  Goal: Ability to achieve and maintain a regular respiratory rate  5/16/2021 0316 by Shelly Lr RN  Outcome: Met This Shift  5/15/2021 1904 by Ranulfo Salazar RN  Outcome: Met This Shift     Problem:  Body Temperature -  Risk of, Imbalanced  Goal: Ability to maintain a body temperature within defined limits  5/16/2021 0316 by Shelly Lr RN  Outcome: Met This Shift  5/15/2021 1904 by Ranulfo Salazar RN  Outcome: Met This Shift  Goal: Will regain or maintain usual level of consciousness  5/16/2021 0316 by Shelly Lr RN  Outcome: Met This Shift  5/15/2021 1904 by Ranulfo Salazar RN  Outcome: Met This Shift  Goal: Complications related to the disease process, condition or treatment will be avoided or minimized  5/16/2021 0316 by Shelly Lr RN  Outcome: Met This Shift  5/15/2021 1904 by Ranulfo Salazar RN  Outcome: Met This Shift     Problem: Isolation Precautions - Risk of Spread of Infection  Goal: Prevent transmission of infection  5/16/2021 0316 by Shelly Lr RN  Outcome: Met This Shift  5/15/2021 1904 by Ranulfo Salazar RN  Outcome: Met This Shift     Problem: Nutrition Deficits  Goal: Optimize nutritional status  5/16/2021 0316 by Shelly Lr RN  Outcome: Met This Shift  5/15/2021 1904 by Ranulfo Salazar RN  Outcome: Met This Shift     Problem: Risk for Fluid Volume Deficit  Goal: Maintain normal heart rhythm  5/16/2021 0316 by Bety Roque RN  Outcome: Met This Shift  5/15/2021 1904 by Fidencio Zavaleta RN  Outcome: Met This Shift  Goal: Maintain absence of muscle cramping  5/16/2021 0316 by Bety Roque RN  Outcome: Met This Shift  5/15/2021 1904 by Fidencio Zavaleta RN  Outcome: Met This Shift  Goal: Maintain normal serum potassium, sodium, calcium, phosphorus, and pH  5/16/2021 0316 by Bety Roque RN  Outcome: Met This Shift  5/15/2021 1904 by Fidencio Zavaleta RN  Outcome: Met This Shift     Problem: Loneliness or Risk for Loneliness  Goal: Demonstrate positive use of time alone when socialization is not possible  5/16/2021 0316 by Bety Roque RN  Outcome: Met This Shift  5/15/2021 1904 by Fidencio Zavaleta RN  Outcome: Met This Shift     Problem: Fatigue  Goal: Verbalize increase energy and improved vitality  5/16/2021 0316 by Bety Roque RN  Outcome: Met This Shift  5/15/2021 1904 by Fidencio Zavaleta RN  Outcome: Met This Shift     Problem: Patient Education: Go to Patient Education Activity  Goal: Patient/Family Education  5/16/2021 0316 by Bety Roque RN  Outcome: Met This Shift  5/15/2021 1904 by Fidencio Zavaleta RN  Outcome: Met This Shift     Problem: Pain:  Goal: Pain level will decrease  Description: Pain level will decrease  5/16/2021 0316 by Bety Roque RN  Outcome: Met This Shift  5/15/2021 1904 by Fidencio Zavaleta RN  Outcome: Met This Shift  Goal: Control of acute pain  Description: Control of acute pain  5/16/2021 0316 by Bety Roque RN  Outcome: Met This Shift  5/15/2021 1904 by Fidencio Zavaleta RN  Outcome: Met This Shift  Goal: Control of chronic pain  Description: Control of chronic pain  5/16/2021 0316 by Bety Roque RN  Outcome: Met This Shift  5/15/2021 1904 by Fidencio Zavaleta RN  Outcome: Met This Shift  Goal: Patient's pain/discomfort is manageable  Description: Patient's pain/discomfort is manageable  5/16/2021 0316 by Toi Reddy GRABIEL Ragsdale  Outcome: Met This Shift  5/15/2021 1904 by Ansley Randolph RN  Outcome: Met This Shift     Problem: Falls - Risk of:  Goal: Will remain free from falls  Description: Will remain free from falls  5/16/2021 0316 by Anali Zheng RN  Outcome: Met This Shift  5/15/2021 1904 by Ansley Randolph RN  Outcome: Met This Shift  Goal: Absence of physical injury  Description: Absence of physical injury  5/16/2021 0316 by Anali Zheng RN  Outcome: Met This Shift  5/15/2021 1904 by Ansley Randolph RN  Outcome: Met This Shift     Problem: Infection:  Goal: Will remain free from infection  Description: Will remain free from infection  5/16/2021 0316 by Anali Zheng RN  Outcome: Met This Shift  5/15/2021 1904 by Ansley Randolph RN  Outcome: Met This Shift     Problem: Safety:  Goal: Free from accidental physical injury  Description: Free from accidental physical injury  5/16/2021 0316 by Anali Zheng RN  Outcome: Met This Shift  5/15/2021 1904 by Ansley Randolph RN  Outcome: Met This Shift  Goal: Free from intentional harm  Description: Free from intentional harm  5/16/2021 0316 by Anali Zheng RN  Outcome: Met This Shift  5/15/2021 1904 by Ansley Randolph RN  Outcome: Met This Shift     Problem: Daily Care:  Goal: Daily care needs are met  Description: Daily care needs are met  5/16/2021 0316 by Anali Zheng RN  Outcome: Met This Shift  5/15/2021 1904 by Ansley Randolph RN  Outcome: Met This Shift     Problem: Skin Integrity:  Goal: Skin integrity will stabilize  Description: Skin integrity will stabilize  5/16/2021 0316 by Anali Zheng RN  Outcome: Met This Shift  5/15/2021 1904 by Ansley Randolph RN  Outcome: Met This Shift     Problem: Discharge Planning:  Goal: Patients continuum of care needs are met  Description: Patients continuum of care needs are met  5/16/2021 0316 by Anali Zheng RN  Outcome: Met This Shift  5/15/2021 1904 by Ansley Randoplh RN  Outcome: Met This Shift

## 2021-05-16 NOTE — PROGRESS NOTES
Pt complains of 10/10 chest tightness, but no pain. Upon examining pt she looks to be in little to no distress, resting in bed, vital signs are stable. EKG preformed and tylenol given. Will continue to monitor.

## 2021-05-16 NOTE — CONSULTS
[facility]  Department of Internal Medicine  Division of Pulmonary, Critical Care and Sleep Medicine  Consult Note  Corinne General, DO, Ruel Round, MD, [facility]    Patient: Casandra Harrison  MRN: [id number]  : 1960    Encounter Time: 2:09 PM     Date of Admission: 2021 12:55 AM    Primary Care Physician: Tai Curiel MD    Reason for Consultation: COVID 19     HISTORY OF PRESENT ILLNESS : Casandra Harrison 56 y.o. female was seen in consultation regarding the above chief compliant. Casandra Harrison is a 56 y.o. female presenting to the ED for Shortness of breath, beginning 1 week ago. The complaint has been persistent, moderate in severity, and worsened by nothing. The patient was diagnosed with COVID-19 on . Since then she has had increasing shortness of breath. Per the patient's daughter she has been sleeping a lot having decreased appetite and running intermittent fevers. Today they went to urgent care to get another test to see if she was able to go back to work but she was having increasing shortness of breath and on her oxygen saturations was noted to be 88% therefore she was sent to our facility for further evaluation. Denies any chest pain. No nausea vomiting diarrhea. No abdominal pain. PAST MEDICAL HISTORY:  has a past medical history of Arthritis of back, Hand pain, Knee pain, right, and Pneumonia. SURGICAL HISTORY:  has a past surgical history that includes  section. SOCIAL HISTORY:  reports that she has never smoked. She has never used smokeless tobacco. She reports that she does not drink alcohol and does not use drugs. FAMILY  HISTORY: family history is not on file. MEDICATIONS:    Prior to Admission medications    Medication Sig Start Date End Date Taking?  Authorizing Provider   Multiple Vitamins-Minerals (THERAPEUTIC MULTIVITAMIN-MINERALS) tablet Take 1 tablet by mouth daily   Yes Historical Provider, MD   naproxen (NAPROSYN) 500 MG tablet Take 1 tablet by mouth 2 times daily as needed for Pain 3/25/17  Yes Veronica Smith Last, APRN - CNP       ALLERGIES: Patient has no known allergies. REVIEW OF SYSTEMS:  Otherwise negative if not reported or listed below  Constitutional:  No unanticipated weight loss. No change in sleep pattern or activity. No fevers, chills or rigors. Eyes:    No visual changes or diplopia. No scleral icterus. ENT:    No Headaches, hearing loss or vertigo. No mouth sores or sore throat. Cardiovascular:  + chest discomfort, palpitations. Respiratory:  + cough, No wheezing      No sputum production. No hemoptysis, pleuritic pain. Gastrointestinal:  No abdominal pain, appetite loss, blood in stools. No hematemesis  Genitourinary:  No dysuria, trouble voiding or hematuria. No nocturia. Musculoskeletal:   No weakness or joint complaints. Integumentary: No rashes or pruritis. Neurological:  No headache, numbness or tingling. Psychiatric:   No effect on mood, memory, mentation, or behavior. No anxiety or depression. Endocrine:   No excessive thirst, fluid intake, or urination. No tremor. Hematologic: No abnormal bruising or bleeding. Lymphatic:  No swollen lymph nodes. Immunologic:  No hives or hx of anaphaxsis.       OBJECTIVE:     PHYSICAL EXAM:   VITALS:   Vitals:    05/15/21 1300 05/15/21 2130 05/16/21 0430 05/16/21 0910   BP: 112/62 113/62 116/68 102/62   Pulse: 79 63 62 100   Resp: 20 18 18 18   Temp: 98.7 °F (37.1 °C) 97.5 °F (36.4 °C) 98.1 °F (36.7 °C) 96.7 °F (35.9 °C)   TempSrc: Infrared Infrared Infrared Oral   SpO2: 94% 93% 93% 92%   Weight:       Height:            Intake/Output Summary (Last 24 hours) at 5/16/2021 1409  Last data filed at 5/16/2021 0612  Gross per 24 hour   Intake 540 ml   Output --   Net 540 ml        CONSTITUTIONAL:   A&O x 3, NAD  SKIN:     No rash, No suspicious lesions or skin discoloration  HEENT:     EOMI, MMM, No thrush  NECK:    No bruits, No JVP apprechiated  CV:      Sinus,  No murmur, No rubs, No gallops  PULMONARY:   Couse BS,  No Wheezing, No Rales, No Rhonchi      No noted egophony  ABDOMEN:     Soft, non-tender. BS normal. No R/R/G  EXT:    No deformities . No clubbing. No lower extremity edema, No venous stasis  PULSE:   Appears equal and palpable.   PSYCHIATRIC:  Seems appropriate, No acute psycosis  MS:    No fractures, No gross weakness  NEUROLOGIC:   The clinical assessment is non-focal     DATA: IMAGING & TESTING:     LABORATORY TESTS:    CBC:   Lab Results   Component Value Date    WBC 14.1 05/16/2021    RBC 4.21 05/16/2021    HGB 12.8 05/16/2021    HCT 39.5 05/16/2021    MCV 93.8 05/16/2021    MCH 30.4 05/16/2021    MCHC 32.4 05/16/2021    RDW 12.9 05/16/2021     05/16/2021    MPV 10.6 05/16/2021     CMP:    Lab Results   Component Value Date     05/16/2021    K 3.9 05/16/2021     05/16/2021    CO2 23 05/16/2021    BUN 15 05/16/2021    CREATININE 0.7 05/16/2021    GFRAA >60 05/16/2021    LABGLOM >60 05/16/2021    GLUCOSE 159 05/16/2021    GLUCOSE 90 06/07/2011    PROT 7.0 05/16/2021    LABALBU 2.9 05/16/2021    LABALBU 4.2 06/07/2011    CALCIUM 8.9 05/16/2021    BILITOT 0.3 05/16/2021    ALKPHOS 53 05/16/2021    AST 39 05/16/2021    ALT 77 05/16/2021     Magnesium:    Lab Results   Component Value Date    MG 2.2 08/02/2015     TSH:  No results found for: TSH  FERRITIN:    Lab Results   Component Value Date    FERRITIN 960 05/16/2021     Fibrinogen Level:  No components found for: FIB     PRO-BNP:   Lab Results   Component Value Date    PROBNP 49 05/12/2021    PROBNP 37 08/02/2015      ABGs: No results found for: PH, PO2, PCO2  Hemoglobin A1C: No components found for: HGBA1C    IMAGING:  Imaging tests were completed and reviewed and discussed radiology and care team involved and reveals   XR CHEST PORTABLE    Result MPH, Suri Gutierrez  Professor of Internal Medicine  Pulmonary, Critical Care and Sleep Medicine

## 2021-05-17 PROCEDURE — 2700000000 HC OXYGEN THERAPY PER DAY

## 2021-05-17 PROCEDURE — 2140000000 HC CCU INTERMEDIATE R&B

## 2021-05-17 PROCEDURE — 6370000000 HC RX 637 (ALT 250 FOR IP): Performed by: INTERNAL MEDICINE

## 2021-05-17 PROCEDURE — 97530 THERAPEUTIC ACTIVITIES: CPT

## 2021-05-17 PROCEDURE — 2580000003 HC RX 258: Performed by: INTERNAL MEDICINE

## 2021-05-17 PROCEDURE — 97162 PT EVAL MOD COMPLEX 30 MIN: CPT

## 2021-05-17 PROCEDURE — 6360000002 HC RX W HCPCS: Performed by: INTERNAL MEDICINE

## 2021-05-17 RX ORDER — ASCORBIC ACID 500 MG
500 TABLET ORAL DAILY
Status: DISCONTINUED | OUTPATIENT
Start: 2021-05-17 | End: 2021-05-18 | Stop reason: HOSPADM

## 2021-05-17 RX ORDER — ZINC SULFATE 50(220)MG
50 CAPSULE ORAL DAILY
Status: DISCONTINUED | OUTPATIENT
Start: 2021-05-17 | End: 2021-05-18 | Stop reason: HOSPADM

## 2021-05-17 RX ORDER — DEXAMETHASONE 4 MG/1
6 TABLET ORAL DAILY
Status: DISCONTINUED | OUTPATIENT
Start: 2021-05-18 | End: 2021-05-18 | Stop reason: HOSPADM

## 2021-05-17 RX ADMIN — SODIUM CHLORIDE, PRESERVATIVE FREE 10 ML: 5 INJECTION INTRAVENOUS at 20:19

## 2021-05-17 RX ADMIN — SODIUM CHLORIDE, PRESERVATIVE FREE 10 ML: 5 INJECTION INTRAVENOUS at 10:04

## 2021-05-17 RX ADMIN — DEXAMETHASONE SODIUM PHOSPHATE 6 MG: 4 INJECTION, SOLUTION INTRAMUSCULAR; INTRAVENOUS at 10:03

## 2021-05-17 RX ADMIN — ENOXAPARIN SODIUM 40 MG: 40 INJECTION SUBCUTANEOUS at 10:04

## 2021-05-17 RX ADMIN — OXYCODONE HYDROCHLORIDE AND ACETAMINOPHEN 500 MG: 500 TABLET ORAL at 15:58

## 2021-05-17 RX ADMIN — Medication 50 MG: at 15:58

## 2021-05-17 NOTE — PLAN OF CARE
Problem: Airway Clearance - Ineffective  Goal: Achieve or maintain patent airway  Outcome: Met This Shift     Problem: Gas Exchange - Impaired  Goal: Absence of hypoxia  Outcome: Met This Shift  Goal: Promote optimal lung function  Outcome: Met This Shift     Problem: Breathing Pattern - Ineffective  Goal: Ability to achieve and maintain a regular respiratory rate  Outcome: Met This Shift     Problem:  Body Temperature -  Risk of, Imbalanced  Goal: Ability to maintain a body temperature within defined limits  Outcome: Met This Shift  Goal: Will regain or maintain usual level of consciousness  Outcome: Met This Shift  Goal: Complications related to the disease process, condition or treatment will be avoided or minimized  Outcome: Met This Shift     Problem: Isolation Precautions - Risk of Spread of Infection  Goal: Prevent transmission of infection  Outcome: Met This Shift     Problem: Nutrition Deficits  Goal: Optimize nutritional status  Outcome: Met This Shift     Problem: Risk for Fluid Volume Deficit  Goal: Maintain normal heart rhythm  Outcome: Met This Shift  Goal: Maintain absence of muscle cramping  Outcome: Met This Shift  Goal: Maintain normal serum potassium, sodium, calcium, phosphorus, and pH  Outcome: Met This Shift     Problem: Loneliness or Risk for Loneliness  Goal: Demonstrate positive use of time alone when socialization is not possible  Outcome: Met This Shift     Problem: Fatigue  Goal: Verbalize increase energy and improved vitality  Outcome: Met This Shift     Problem: Patient Education: Go to Patient Education Activity  Goal: Patient/Family Education  Outcome: Met This Shift     Problem: Pain:  Goal: Pain level will decrease  Description: Pain level will decrease  Outcome: Met This Shift  Goal: Control of acute pain  Description: Control of acute pain  Outcome: Met This Shift  Goal: Control of chronic pain  Description: Control of chronic pain  Outcome: Met This Shift  Goal: Patient's

## 2021-05-17 NOTE — CARE COORDINATION
SOCIAL WORK/DISCHARGE PLANNING;  Care coordination update; Pt down to 4L02. Pt given last dose of IV decadron and being switched to oral decadron. PT/OT ordered. Pt seen by OT([facility]-19). Called rehab for P.T eval. Will need pulse ox testing prior to discharge. Pt per prior note does not have a DME preference. Plan is for discharge home with family support.   [facility]  [phone]

## 2021-05-18 VITALS
DIASTOLIC BLOOD PRESSURE: 62 MMHG | OXYGEN SATURATION: 96 % | BODY MASS INDEX: 32.13 KG/M2 | HEIGHT: 68 IN | RESPIRATION RATE: 18 BRPM | TEMPERATURE: 98 F | HEART RATE: 75 BPM | WEIGHT: 212 LBS | SYSTOLIC BLOOD PRESSURE: 114 MMHG

## 2021-05-18 LAB — C-REACTIVE PROTEIN: 1.2 MG/DL (ref 0–0.4)

## 2021-05-18 PROCEDURE — 36415 COLL VENOUS BLD VENIPUNCTURE: CPT

## 2021-05-18 PROCEDURE — 6370000000 HC RX 637 (ALT 250 FOR IP): Performed by: INTERNAL MEDICINE

## 2021-05-18 PROCEDURE — 86140 C-REACTIVE PROTEIN: CPT

## 2021-05-18 PROCEDURE — 2700000000 HC OXYGEN THERAPY PER DAY

## 2021-05-18 PROCEDURE — 2580000003 HC RX 258: Performed by: INTERNAL MEDICINE

## 2021-05-18 PROCEDURE — 6360000002 HC RX W HCPCS: Performed by: INTERNAL MEDICINE

## 2021-05-18 RX ORDER — ZINC SULFATE 50(220)MG
50 CAPSULE ORAL DAILY
Qty: 30 CAPSULE | Refills: 3 | COMMUNITY
Start: 2021-05-18 | End: 2022-10-21

## 2021-05-18 RX ORDER — DEXAMETHASONE 6 MG/1
6 TABLET ORAL DAILY
Qty: 10 TABLET | Refills: 0 | Status: SHIPPED | OUTPATIENT
Start: 2021-05-18 | End: 2021-05-28

## 2021-05-18 RX ORDER — ASCORBIC ACID 500 MG
500 TABLET ORAL DAILY
Qty: 30 TABLET | Refills: 3 | Status: SHIPPED | OUTPATIENT
Start: 2021-05-18 | End: 2022-10-21

## 2021-05-18 RX ADMIN — SODIUM CHLORIDE, PRESERVATIVE FREE 10 ML: 5 INJECTION INTRAVENOUS at 08:50

## 2021-05-18 RX ADMIN — OXYCODONE HYDROCHLORIDE AND ACETAMINOPHEN 500 MG: 500 TABLET ORAL at 08:49

## 2021-05-18 RX ADMIN — ENOXAPARIN SODIUM 40 MG: 40 INJECTION SUBCUTANEOUS at 08:49

## 2021-05-18 RX ADMIN — DEXAMETHASONE 6 MG: 4 TABLET ORAL at 08:49

## 2021-05-18 RX ADMIN — Medication 50 MG: at 08:49

## 2021-05-18 ASSESSMENT — PAIN SCALES - GENERAL
PAINLEVEL_OUTOF10: 0
PAINLEVEL_OUTOF10: 0

## 2021-05-18 NOTE — PROGRESS NOTES
Dr. Lobo Jarvis notified that patient requires an order for home oxygen 2 liters.    Jose Gilbert RN

## 2021-05-18 NOTE — PLAN OF CARE
Problem: Airway Clearance - Ineffective  Goal: Achieve or maintain patent airway  Outcome: Met This Shift     Problem: Gas Exchange - Impaired  Goal: Absence of hypoxia  Outcome: Met This Shift  Goal: Promote optimal lung function  Outcome: Met This Shift     Problem: Breathing Pattern - Ineffective  Goal: Ability to achieve and maintain a regular respiratory rate  Outcome: Met This Shift     Problem:  Body Temperature -  Risk of, Imbalanced  Goal: Ability to maintain a body temperature within defined limits  Outcome: Met This Shift  Goal: Will regain or maintain usual level of consciousness  Outcome: Met This Shift     Problem: Isolation Precautions - Risk of Spread of Infection  Goal: Prevent transmission of infection  Outcome: Met This Shift     Problem: Risk for Fluid Volume Deficit  Goal: Maintain normal heart rhythm  Outcome: Met This Shift  Goal: Maintain absence of muscle cramping  Outcome: Met This Shift     Problem: Pain:  Goal: Pain level will decrease  Description: Pain level will decrease  Outcome: Met This Shift  Goal: Control of acute pain  Description: Control of acute pain  Outcome: Met This Shift     Problem: Falls - Risk of:  Goal: Will remain free from falls  Description: Will remain free from falls  Outcome: Met This Shift  Goal: Absence of physical injury  Description: Absence of physical injury  Outcome: Met This Shift     Problem: Safety:  Goal: Free from accidental physical injury  Description: Free from accidental physical injury  Outcome: Met This Shift  Goal: Free from intentional harm  Description: Free from intentional harm  Outcome: Met This Shift     Problem: Skin Integrity:  Goal: Skin integrity will stabilize  Description: Skin integrity will stabilize  Outcome: Met This Shift

## 2021-05-18 NOTE — PROGRESS NOTES
PULSE OX ON ROOM AIR SITTING _91___%   PULSE OX ON ROOM AIR AMBULATING _86__%   PULSE OX ON _2__LITERS SITTING RECOVERY _94__%   PULSE OX ON __2_LITERS AMBULATING RECOVERY__91_%   PULSE OX ON __2_ LITERS AMBULATING RECOVERY ___  Patient reports feeling short of breath while ambulating, with and without oxygen, and for a short time after sitting with O2.

## 2021-05-18 NOTE — PROGRESS NOTES
Admit Date: 5/13/2021    Subjective:     Feels good no complaint still on O2 NC     Objective:     Patient Vitals for the past 8 hrs:   BP Temp Temp src Pulse Resp SpO2   05/18/21 0015 (!) 102/58 97.8 °F (36.6 °C) Oral 80 18 93 %     I/O last 3 completed shifts: In: 1000 [P.O.:990; I.V.:10]  Out: 1200 [Urine:1200]  No intake/output data recorded. HEENT: Normal  NECK: Thyroid normal. No carotid bruit. No lymphphadenopathy. CVS: RRR  RS: Clear. No wheeze. No rhonchi. Good airflow bilaterally. ABD: Soft. Non tender. No mass. Normal BS. EXT: No edema. Non tender. Pulses present. Skin intact.   NEURO:no focal deficit       Scheduled Meds:   dexamethasone  6 mg Oral Daily    zinc sulfate  50 mg Oral Daily    ascorbic acid  500 mg Oral Daily    sodium chloride flush  5-40 mL Intravenous 2 times per day    enoxaparin  40 mg Subcutaneous Daily     Continuous Infusions:   sodium chloride         CBC with Differential:    Lab Results   Component Value Date    WBC 14.1 05/16/2021    RBC 4.21 05/16/2021    HGB 12.8 05/16/2021    HCT 39.5 05/16/2021     05/16/2021    MCV 93.8 05/16/2021    MCH 30.4 05/16/2021    MCHC 32.4 05/16/2021    RDW 12.9 05/16/2021    LYMPHOPCT 8.1 05/16/2021    MONOPCT 4.3 05/16/2021    BASOPCT 0.1 05/16/2021    MONOSABS 0.61 05/16/2021    LYMPHSABS 1.14 05/16/2021    EOSABS 0.03 05/16/2021    BASOSABS 0.02 05/16/2021     CMP:    Lab Results   Component Value Date     05/16/2021    K 3.9 05/16/2021     05/16/2021    CO2 23 05/16/2021    BUN 15 05/16/2021    CREATININE 0.7 05/16/2021    GFRAA >60 05/16/2021    LABGLOM >60 05/16/2021    PROT 7.0 05/16/2021    LABALBU 2.9 05/16/2021    LABALBU 4.2 06/07/2011    CALCIUM 8.9 05/16/2021    BILITOT 0.3 05/16/2021    ALKPHOS 53 05/16/2021    AST 39 05/16/2021    ALT 77 05/16/2021     PT/INR:    Lab Results   Component Value Date    PROTIME 14.7 05/12/2021    PROTIME 11.8 06/07/2011    INR 1.3 05/12/2021       Assessment:     Active

## 2021-05-18 NOTE — PROGRESS NOTES
Message sent to Dr. Carmelo Magdaleno via perfect [employer] regarding plan to discharge patient today. Patient needs two liters to go home. Question if patient is ok for discharge. Dr. Clark Lyle back and states ok.    Alia Holliday RN

## 2021-05-18 NOTE — ADT AUTH CERT
Viral Illness, Acute - Care Day 5 (5/17/2021) by Uri Toth RN       Review Status Review Entered   Completed 5/18/2021 12:02      Criteria Review      Care Day: 5 Care Date: 5/17/2021 Level of Care: Intermediate Care    Guideline Day 2    Clinical Status    ( ) * Hypotension absent    5/18/2021 12:02 PM EDT by Qing Petite      98/50    (X) * No requirement for mechanical ventilation    ( ) * Oxygenation at baseline or improved    5/18/2021 12:02 PM EDT by Qing Petite      3.5 L nc    (X) * Mental status at baseline    5/18/2021 12:02 PM EDT by Qing Petsteven      wdl    Activity    ( ) Advance activity as tolerated    5/18/2021 12:02 PM EDT by Qing Petite      cont bedrest    Routes    (X) * Oral hydration    5/18/2021 12:02 PM EDT by Qing Petite      cont same    (X) Oral or IV medications    5/18/2021 12:02 PM EDT by Qing Petite      cont current meds plus  vit C 500 mg qd po  zincate 50 mg qd po    (X) Usual diet    5/18/2021 12:02 PM EDT by Qing Petite      cont same    Interventions    (X) Possible isolation    (X) Pulse oximetry    5/18/2021 12:02 PM EDT by Qing Petite      spot checks   94%    (X) Possible oxygen    5/18/2021 12:02 PM EDT by Qing Petite      3.5 L nc    * Milestone   Additional Notes   5/17/2021      97.7 18 75 98/20 94% 3.5 L nc      Cont ivf @ 75/hr       Will need home O2 eval prior to discharge      ============================================================      Per pulmo       Assessment:    1. COVID 19 pneumonia   2. Acute Respiratory Failure   3. [place]au[place] speaking   4. DM    5. HTN   6. DJD   7. Obesity                Plan:    1. Decadron 10 mg daily for 10 days   2. Bronchodilators   3. Wean oxygen   4. Check Inflammatory markers   5. Add TOCI if able   6. Monitor    7. Self Proning   8.  Vitamin C and Zinc orally    ______________________________________________________________      Per IM       Assessment:       Active Problems:     COVID-19   Hypoxic respiratory failure            Plan:   Continue same             Viral Illness, Acute - Care Day 3 (5/15/2021) by Paul Solis RN       Review Status Review Entered   Completed 5/18/2021 11:58      Criteria Review      Care Day: 3 Care Date: 5/15/2021 Level of Care: Intermediate Care    Guideline Day 2    Clinical Status    ( ) * Hypotension absent    5/18/2021 11:58 AM EDT by Ranjit Yadav      88/58    (X) * No requirement for mechanical ventilation    ( ) * Oxygenation at baseline or improved    5/18/2021 11:58 AM EDT by Ranjit Yadav      5 L nc    (X) * Mental status at baseline    5/18/2021 11:58 AM EDT by Ranjit Yadav      WDL    Activity    ( ) Advance activity as tolerated    5/18/2021 11:58 AM EDT by Ranjit Yadav      strict bedrest    Routes    (X) * Oral hydration    5/18/2021 11:58 AM EDT by Ranjit Yadav      cont same    (X) Oral or IV medications    5/18/2021 11:58 AM EDT by Ranjit Yadav      cont current meds plus  tylenol 650 m q6 prn x1 po    (X) Usual diet    5/18/2021 11:58 AM EDT by Ranjit Yadav      general diet    Interventions    (X) Possible isolation    5/18/2021 11:58 AM EDT by Ranjit Yadav      cont    (X) Pulse oximetry    5/18/2021 11:58 AM EDT by Ranjit Yadav      93-95%    (X) Possible oxygen    5/18/2021 11:58 AM EDT by Ranjit Yadav      4.5-5 l nc    * Milestone   Additional Notes   5/15/2021      96.8 20 96 88/58 94% 4.5 L nc      Ekg   Sinus bradycardia with short AK   Otherwise normal ECG   When compared with ECG of 12-MAY-2021 20:31,   Significant changes have occurred      Iv ns @ 75 ml/hr   =========================================================      Per IM Assessment:   Active Problems:     COVID-19   Resolved Problems:     * No resolved hospital problems. *       Plan:   1. Continue present care. 2. Will follow in a.m. [place]

## 2021-05-18 NOTE — CARE COORDINATION
SOCIAL WORK/DISCHARGE PLANNING;  Spoke with pt. She is for discharge home today with 02. Made referral to [facility] for 02. Pt on 2L 02. Awaiting physician order. [facility]  [phone]    Home 02 order in chart. Notified Melissa Galindo of [facility].   [facility]  [phone]

## 2021-05-19 ENCOUNTER — CARE COORDINATION (OUTPATIENT)
Dept: CASE MANAGEMENT | Age: 61
End: 2021-05-19

## 2021-05-19 NOTE — DISCHARGE SUMMARY
Discharge Summary    Date: 5/19/2021  Patient Name: Carlene Patton YOB: 1960 Age: 56 y.o. Admit Date: 5/13/2021  Discharge Date: 5/18/2021  Discharge Condition: Stable    Admission Diagnosis  COVID-19 (U07.1)     Discharge Diagnosis  Active Problems: COVID-19Resolved Problems: * No resolved hospital problems. [facility] Stay  Narrative of Hospital Course:  Admitted from ER with increase SOB known covid pos. Week earlier was out of remdesivir treatment given steroid O2 supplement pulmonary hygiene seen by pulmonary slowly improved stabilized discharged home on O2 NC     Consultants:  IP CONSULT TO [address] CONSULT TO PULMONOLOGYIP CONSULT TO PHARMACY    Surgeries/procedures Performed:       Treatments:           Discharge Plan/Disposition:  Home    Hospital/Incidental Findings Requiring Follow Up:    Patient Instructions:    Diet: Regular Diet    Activity:Activity as Tolerated  For number of days (if applicable): Other Instructions:    Provider Follow-Up:   No follow-ups on file.      Significant Diagnostic Studies:    Recent Labs:  Admission on 05/13/2021, Discharged on 05/18/2021WBC                                         Date: 05/12/2021Value: 9.0         Ref range: 4.5 - 11.5 E9/L    Status: FinalRBC                                           Date: 05/12/2021Value: 4.47        Ref range: 3.50 - 5.50 E12/L  Status: FinalHemoglobin                                    Date: 05/12/2021Value: 13.4        Ref range: 11.5 - 15.5 g/dL   Status: FinalHematocrit                                    Date: 05/12/2021Value: 42.0        Ref range: 34.0 - 48.0 %      Status: FinalMCV                                           Date: 05/12/2021Value: 94.0        Ref range: 80.0 - 99.9 fL     Status: [address]                                           Date: 05/12/2021Value: 30.0        Ref range: 26.0 - 35.0 pg     Status: [address] [facility]                                          Date: 05/12/2021Value: 31.9*       Ref range: 32.0 - 34.5 %      Status: FinalRDW                                           Date: 05/12/2021Value: 13.2        Ref range: 11.5 - 15.0 fL     Status: FinalPlatelets                                     Date: 05/12/2021Value: 323         Ref range: 130 - 450 E9/L     Status: FinalMPV                                           Date: 05/12/2021Value: 10.1        Ref range: 7.0 - 12.0 fL      Status: FinalNeutrophils %                                 Date: 05/12/2021Value: 76.3        Ref range: 43.0 - 80.0 %      Status: FinalImmature Granulocytes %                       Date: 05/12/2021Value: 0.8         Ref range: 0.0 - 5.0 %        Status: FinalLymphocytes %                                 Date: 05/12/2021Value: 16.4*       Ref range: 20.0 - 42.0 %      Status: FinalMonocytes %                                   Date: 05/12/2021Value: 6.2         Ref range: 2.0 - 12.0 %       Status: FinalEosinophils %                                 Date: 05/12/2021Value: 0.2         Ref range: 0.0 - 6.0 %        Status: FinalBasophils %                                   Date: 05/12/2021Value: 0.1         Ref range: 0.0 - 2.0 %        Status: FinalNeutrophils Absolute                          Date: 05/12/2021Value: 6.86        Ref range: 1.80 - 7.30 E9/L   Status: FinalImmature Granulocytes #                       Date: 05/12/2021Value: 0.07        Ref range: E9/L               Status: FinalLymphocytes Absolute                          Date: 05/12/2021Value: 1.48*       Ref range: 1.50 - 4.00 E9/L   Status: FinalMonocytes Absolute                            Date: 05/12/2021Value: 0.56        Ref range: 0.10 - 0.95 E9/L   Status: FinalEosinophils Absolute                          Date: 05/12/2021Value: 0.02*       Ref range: 0.05 - 0.50 E9/L   Status: FinalBasophils Absolute                            Date: 05/12/2021Value: 0.01        Ref range: 0.00 - 0.20 E9/L   Status: FinalSodium Status: Final              Comment: Rapid NAAT:   Negative results should be treated as presumptive and,if inconsistent with clinical signs and symptoms or necessary forpatient management, should be tested with an alternative molecularassay. Negative results do not preclude SARS-CoV-2 infection andshould not be used as the sole basis for patient management decisions. This test has been authorized by the FDA under an Emergency UseAuthorization (EUA) for use by authorized laboratories. Fact sheet for Healthcare [employer] sheet for Patients: Diane.dk: Isothermal Nucleic Acid AmplificationProtime                                       Date: 05/12/2021Value: 14.7*       Ref range: 9.3 - 12.4 sec     Status: FinalINR                                           Date: 05/12/2021Value: 1.3           Status: FinalD-Dimer, Quant                                Date: 05/13/2021Value: 913         Ref range: ng/mL DDU          Status: Final              Comment: D-DIMER Interpretation:<  230  ng/mL (D-DU)  Indicates low probability for PE/DVT = 232  ng/mL (D-DU)  Upper Limit of Normal>= 4000 ng/mL (D-DU)  This level could suggest the presence                      of DIC. Clinical correlation may be                      helpful. Ventricular Rate                              Date: 05/15/2021Value: 56          Ref range: BPM                Status: FinalAtrial Rate                                   Date: 05/15/2021Value: 56          Ref range: BPM                Status: FinalP-R Interval                                  Date: 05/15/2021Value: 108         Ref range: ms                 Status: FinalQRS Duration                                  Date: 05/15/2021Value: 88          Ref range: ms                 Status: FinalQ-T Interval                                  Date: 05/15/2021Value: 448         Ref range: ms                 Status: FinalQTc Calculation (Bazett)                      Date: 05/15/2021Value: 432         Ref range: ms                 Status: FinalP Axis                                        Date: 05/15/2021Value: 12          Ref range: degrees            Status: FinalR Axis                                        Date: 05/15/2021Value: 75          Ref range: degrees            Status: FinalT Axis                                        Date: 05/15/2021Value: 52          Ref range: degrees            Status: FinalCRP                                           Date: 05/16/2021Value: 2.1*        Ref range: 0.0 - 0.4 mg/dL    Status: [address] [facility]                                           Date: 05/16/2021Value: 14.1*       Ref range: 4.5 - 11.5 E9/L    Status: FinalRBC                                           Date: 05/16/2021Value: 4.21        Ref range: 3.50 - 5.50 E12/L  Status: FinalHemoglobin                                    Date: 05/16/2021Value: 12.8        Ref range: 11.5 - 15.5 g/dL   Status: FinalHematocrit                                    Date: 05/16/2021Value: 39.5        Ref range: 34.0 - 48.0 %      Status: FinalMCV                                           Date: 05/16/2021Value: 93.8        Ref range: 80.0 - 99.9 fL     [facility]                                           Date: 05/16/2021Value: 30.4        Ref range: 26.0 - 35.0 pg     Status: [facility]                                          Date: 05/16/2021Value: 32.4        Ref range: 32.0 - 34.5 %      Status: FinalRDW                                           Date: 05/16/2021Value: 12.9        Ref range: 11.5 - 15.0 fL     Status: FinalPlatelets                                     Date: 05/16/2021Value: 460*        Ref range: 130 - 450 E9/L     Status: FinalMPV                                           Date: 05/16/2021Value: 10.6        Ref range: 7.0 - 12.0 fL      Status: FinalNeutrophils %                                 Date: 05/16/2021Value: 86.2*       Ref range: 43.0 - 80.0 %      Status: FinalImmature Granulocytes %                       Date: 05/16/2021Value: 1.1         Ref range: 0.0 - 5.0 %        Status: FinalLymphocytes %                                 Date: 05/16/2021Value: 8.1*        Ref range: 20.0 - 42.0 %      Status: FinalMonocytes %                                   Date: 05/16/2021Value: 4.3         Ref range: 2.0 - 12.0 %       Status: FinalEosinophils %                                 Date: 05/16/2021Value: 0.2         Ref range: 0.0 - 6.0 %        Status: FinalBasophils %                                   Date: 05/16/2021Value: 0.1         Ref range: 0.0 - 2.0 %        Status: FinalNeutrophils Absolute                          Date: 05/16/2021Value: 12.18*      Ref range: 1.80 - 7.30 E9/L   Status: FinalImmature Granulocytes #                       Date: 05/16/2021Value: 0.15        Ref range: E9/L               Status: FinalLymphocytes Absolute                          Date: 05/16/2021Value: 1.14*       Ref range: 1.50 - 4.00 E9/L   Status: FinalMonocytes Absolute                            Date: 05/16/2021Value: 0.61        Ref range: 0.10 - 0.95 E9/L   Status: FinalEosinophils Absolute                          Date: 05/16/2021Value: 0.03*       Ref range: 0.05 - 0.50 E9/L   Status: FinalBasophils Absolute                            Date: 05/16/2021Value: 0.02        Ref range: 0.00 - 0.20 E9/L   Status: FinalCK-MB                                         Date: 05/16/2021Value: 1.0         Ref range: 0.0 - 4.3 ng/mL    Status: FinalTotal CK                                      Date: 05/16/2021Value: 31          Ref range: 20 - 180 U/L       Status: FinalSodium                                        Date: 05/16/2021Value: 140         Ref range: 132 - 146 mmol/L   Status: FinalPotassium                                     Date: 05/16/2021Value: 3.9         Ref range: 3.5 - 5.0 mmol/L   Status: FinalChloride                                      Date: 05/16/2021Value: 106         Ref range: 98 - 107 mmol/L    Status: FinalCO2                                           Date: 05/16/2021Value: 23          Ref range: 22 - 29 mmol/L     Status: FinalAnion Gap                                     Date: 05/16/2021Value: 11          Ref range: 7 - 16 mmol/L      Status: FinalGlucose                                       Date: 05/16/2021Value: 159*        Ref range: 74 - 99 mg/dL      Status: FinalBUN                                           Date: 05/16/2021Value: 15          Ref range: 6 - 23 mg/dL       Status: FinalCREATININE                                    Date: 05/16/2021Value: 0.7         Ref range: 0.5 - 1.0 mg/dL    Status: FinalGFR Non-                      Date: 05/16/2021Value: >60         Ref range: >=60 mL/min/1.73   Status: Final              Comment: Chronic Kidney Disease: less than 60 ml/min/1.73 sq.m. Kidney Failure: less than 15 ml/min/1.73 sq. m. Results valid for patients 18 years and older. GFR                           Date: 05/16/2021Value: >60           Status: FinalCalcium                                       Date: 05/16/2021Value: 8.9         Ref range: 8.6 - 10.2 mg/dL   Status: FinalTotal Protein                                 Date: 05/16/2021Value: 7.0         Ref range: 6.4 - 8.3 g/dL     Status: FinalAlbumin                                       Date: 05/16/2021Value: 2.9*        Ref range: 3.5 - 5.2 g/dL     Status: FinalTotal Bilirubin                               Date: 05/16/2021Value: 0.3         Ref range: 0.0 - 1.2 mg/dL    Status: FinalAlkaline Phosphatase                          Date: 05/16/2021Value: 53          Ref range: 35 - 104 U/L       Status: FinalALT                                           Date: 05/16/2021Value: 77*         Ref range: 0 - 32 U/L         Status: FinalAST                                           Date: 05/16/2021Value: 39*         Ref range: 0 - 31 U/L Status: FinalD-Dimer, Quant                                Date: 05/16/2021Value: 1512        Ref range: ng/mL DDU          Status: Final              Comment: D-DIMER Interpretation:<  230  ng/mL (D-DU)  Indicates low probability for PE/DVT = 232  ng/mL (D-DU)  Upper Limit of Normal>= 4000 ng/mL (D-DU)  This level could suggest the presence                      of DIC. Clinical correlation may be                      helpful. Ferritin                                      Date: 05/16/2021Value: 960         Ref range: ng/mL              Status: Final              Comment: FERRITIN Reference Ranges:Adult Males   20 - 60 years:    27 - 400 ng/mLAdult females 16 - 60 years:    15 - 150 ng/mLAdults greater than 60 years:   no established reference rangePediatrics:                     no established reference rangeFibrinogen                                    Date: 05/16/2021Value: >700*       Ref range: 225 - 540 mg/dL    Status: FinalProcalcitonin                                 Date: 05/16/2021Value: 0.07        Ref range: 0.00 - 0.08 ng/mL  Status: Final              Comment: Suspected Sepsis:Low likelihood of sepsis  <.50 ng/mLIncreased likelihood of sepsis 0.50-2.00 ng/mLAntibiotics encouragedHigh risk of sepsis/shock   >2.00 ng/mLAntibiotics strongly encouragedSuspected Lower Respiratory Tract Infections:Low likelihood of bacterial infection  <0.24 ng/mLIncreased likelihood of bacterial infection >0.24 ng/mLAntibiotics encouragedWith successful antibiotic therapy, PCT levels should decreaserapidly. (Half-life of 24 to 36 hours. )Procalcitonin values from samples collected within the first6 hours of systemic infection may still be low. Retesting may be indicated. Values from day 1 and day 4 can be entered into the Change inProcalcitonin Calculator to determine the patient'sMortality Risk http://www.park.info/. com)In healthy neonates, plasma Procalcitonin (PCT) concentrationsincrease gradually after birth,

## 2021-05-20 ENCOUNTER — CARE COORDINATION (OUTPATIENT)
Dept: CASE MANAGEMENT | Age: 61
End: 2021-05-20

## 2021-05-20 NOTE — CARE COORDINATION
Care Transitions Outreach Attempt    Call within 2 business days of discharge: Yes     Attempt #2 to reach patient through [facility]  #[phone] for transitions of care follow up. Unable to reach patient. Left message requesting call back    Patient: Jose Alfredo Ochoa Patient : 1960 MRN: [id number]    Last Discharge [facility]       Complaint Diagnosis Description Type Department Provider    21 Positive For Covid-19 Acute respiratory failure with hypoxia ([address] . .. ED to Hosp-Admission (Discharged) (ADMITTED) Juanita Arciniega. Antoine Staley MD; [place] . .. Was this an external facility discharge? No Discharge Facility: [facility]    Noted following upcoming appointments from discharge chart review:   [facility] follow up appointment(s): No future appointments.     Michael Stoner, RN BSN   Care Transitions Nurse  [phone]

## 2021-10-13 LAB — MAMMOGRAPHY, EXTERNAL: NORMAL

## 2021-11-01 ENCOUNTER — HOSPITAL ENCOUNTER (OUTPATIENT)
Dept: GENERAL RADIOLOGY | Age: 61
Discharge: HOME | End: 2021-11-03
Payer: COMMERCIAL

## 2021-11-01 ENCOUNTER — HOSPITAL ENCOUNTER (OUTPATIENT)
Age: 61
Discharge: HOME | End: 2021-11-03
Payer: COMMERCIAL

## 2021-11-01 ENCOUNTER — HOSPITAL ENCOUNTER (OUTPATIENT)
Dept: CT IMAGING | Age: 61
Discharge: HOME | End: 2021-11-03
Payer: COMMERCIAL

## 2021-11-01 DIAGNOSIS — U07.1 PNEUMONIA DUE TO COVID-19 VIRUS: ICD-10-CM

## 2021-11-01 DIAGNOSIS — J12.82 PNEUMONIA DUE TO COVID-19 VIRUS: ICD-10-CM

## 2021-11-01 DIAGNOSIS — R10.9 STOMACH ACHE: ICD-10-CM

## 2021-11-01 DIAGNOSIS — R10.84 GENERALIZED ABDOMINAL PAIN: ICD-10-CM

## 2021-11-01 DIAGNOSIS — K43.9 ABDOMINAL WALL HERNIA: ICD-10-CM

## 2021-11-01 DIAGNOSIS — K43.9 VENTRAL HERNIA WITHOUT OBSTRUCTION OR GANGRENE: ICD-10-CM

## 2021-11-01 PROCEDURE — 71046 X-RAY EXAM CHEST 2 VIEWS: CPT

## 2021-11-01 PROCEDURE — 74176 CT ABD & PELVIS W/O CONTRAST: CPT

## 2021-11-01 PROCEDURE — 6360000004 HC RX CONTRAST MEDICATION: Performed by: RADIOLOGY

## 2021-11-01 RX ADMIN — IOHEXOL 50 ML: 240 INJECTION, SOLUTION INTRATHECAL; INTRAVASCULAR; INTRAVENOUS; ORAL at 13:27

## 2021-11-19 LAB
ALBUMIN SERPL-MCNC: NORMAL G/DL
ALP BLD-CCNC: NORMAL U/L
ALT SERPL-CCNC: NORMAL U/L
ANION GAP SERPL CALCULATED.3IONS-SCNC: NORMAL MMOL/L
AST SERPL-CCNC: NORMAL U/L
AVERAGE GLUCOSE: 123
BILIRUB SERPL-MCNC: NORMAL MG/DL
BUN BLDV-MCNC: NORMAL MG/DL
CALCIUM SERPL-MCNC: NORMAL MG/DL
CHLORIDE BLD-SCNC: NORMAL MMOL/L
CO2: NORMAL
CREAT SERPL-MCNC: NORMAL MG/DL
GFR CALCULATED: NORMAL
GLUCOSE BLD-MCNC: NORMAL MG/DL
HBA1C MFR BLD: 5.9 %
POTASSIUM SERPL-SCNC: NORMAL MMOL/L
SODIUM BLD-SCNC: NORMAL MMOL/L
TOTAL PROTEIN: NORMAL

## 2022-03-30 LAB — ADDENDUM ELECTROPHORESIS URINE RANDOM: NORMAL

## 2022-05-02 ENCOUNTER — ANESTHESIA EVENT (OUTPATIENT)
Dept: INTERVENTIONAL RADIOLOGY/VASCULAR | Age: 62
End: 2022-05-02

## 2022-05-02 ENCOUNTER — ANESTHESIA (OUTPATIENT)
Dept: INTERVENTIONAL RADIOLOGY/VASCULAR | Age: 62
End: 2022-05-02

## 2022-05-02 ENCOUNTER — HOSPITAL ENCOUNTER (OUTPATIENT)
Dept: INTERVENTIONAL RADIOLOGY/VASCULAR | Age: 62
Discharge: HOME | End: 2022-05-04
Payer: COMMERCIAL

## 2022-05-02 VITALS
OXYGEN SATURATION: 100 % | RESPIRATION RATE: 16 BRPM | SYSTOLIC BLOOD PRESSURE: 115 MMHG | DIASTOLIC BLOOD PRESSURE: 70 MMHG

## 2022-05-02 VITALS
RESPIRATION RATE: 18 BRPM | HEART RATE: 74 BPM | TEMPERATURE: 97.5 F | DIASTOLIC BLOOD PRESSURE: 71 MMHG | SYSTOLIC BLOOD PRESSURE: 110 MMHG | OXYGEN SATURATION: 97 %

## 2022-05-02 DIAGNOSIS — C90.00 MYELOMA ASSOCIATED AMYLOIDOSIS (HCC): ICD-10-CM

## 2022-05-02 DIAGNOSIS — E85.9 MYELOMA ASSOCIATED AMYLOIDOSIS (HCC): ICD-10-CM

## 2022-05-02 DIAGNOSIS — D47.2 MONOCLONAL PARAPROTEINEMIA: ICD-10-CM

## 2022-05-02 LAB
ANION GAP SERPL CALCULATED.3IONS-SCNC: 11 MMOL/L (ref 7–16)
BUN BLDV-MCNC: 13 MG/DL (ref 6–23)
CALCIUM SERPL-MCNC: 9.6 MG/DL (ref 8.6–10.2)
CHLORIDE BLD-SCNC: 105 MMOL/L (ref 98–107)
CO2: 23 MMOL/L (ref 22–29)
CREAT SERPL-MCNC: 0.9 MG/DL (ref 0.5–1)
GFR AFRICAN AMERICAN: >60
GFR NON-AFRICAN AMERICAN: >60 ML/MIN/1.73
GLUCOSE BLD-MCNC: 97 MG/DL (ref 74–99)
INR BLD: 1.2
POTASSIUM SERPL-SCNC: 3.9 MMOL/L (ref 3.5–5)
PROTHROMBIN TIME: 12.5 SEC (ref 9.3–12.4)
SODIUM BLD-SCNC: 139 MMOL/L (ref 132–146)

## 2022-05-02 PROCEDURE — 7100000011 IR BONE MARROW BIOPSY AND ASPIRATION

## 2022-05-02 PROCEDURE — 88305 TISSUE EXAM BY PATHOLOGIST: CPT

## 2022-05-02 PROCEDURE — 6360000002 HC RX W HCPCS: Performed by: ANESTHESIOLOGIST ASSISTANT

## 2022-05-02 PROCEDURE — 88374 M/PHMTRC ALYS ISHQUANT/SEMIQ: CPT

## 2022-05-02 PROCEDURE — 2580000003 HC RX 258: Performed by: ANESTHESIOLOGIST ASSISTANT

## 2022-05-02 PROCEDURE — 88342 IMHCHEM/IMCYTCHM 1ST ANTB: CPT

## 2022-05-02 PROCEDURE — C1830 POWER BONE MARROW BX NEEDLE: HCPCS

## 2022-05-02 PROCEDURE — 88341 IMHCHEM/IMCYTCHM EA ADD ANTB: CPT

## 2022-05-02 PROCEDURE — 36415 COLL VENOUS BLD VENIPUNCTURE: CPT

## 2022-05-02 PROCEDURE — 88237 TISSUE CULTURE BONE MARROW: CPT

## 2022-05-02 PROCEDURE — 6360000002 HC RX W HCPCS: Performed by: RADIOLOGY

## 2022-05-02 PROCEDURE — 88365 INSITU HYBRIDIZATION (FISH): CPT

## 2022-05-02 PROCEDURE — 85610 PROTHROMBIN TIME: CPT

## 2022-05-02 PROCEDURE — 88311 DECALCIFY TISSUE: CPT

## 2022-05-02 PROCEDURE — 88185 FLOWCYTOMETRY/TC ADD-ON: CPT

## 2022-05-02 PROCEDURE — 88184 FLOWCYTOMETRY/ TC 1 MARKER: CPT

## 2022-05-02 PROCEDURE — 88313 SPECIAL STAINS GROUP 2: CPT

## 2022-05-02 PROCEDURE — 88364 INSITU HYBRIDIZATION (FISH): CPT

## 2022-05-02 PROCEDURE — 77002 NEEDLE LOCALIZATION BY XRAY: CPT

## 2022-05-02 PROCEDURE — 3700000001 HC ADD 15 MINUTES (ANESTHESIA)

## 2022-05-02 PROCEDURE — 2500000003 HC RX 250 WO HCPCS: Performed by: RADIOLOGY

## 2022-05-02 PROCEDURE — 38222 DX BONE MARROW BX & ASPIR: CPT

## 2022-05-02 PROCEDURE — 80048 BASIC METABOLIC PNL TOTAL CA: CPT

## 2022-05-02 PROCEDURE — 3700000000 HC ANESTHESIA ATTENDED CARE

## 2022-05-02 RX ORDER — BUPIVACAINE HYDROCHLORIDE 2.5 MG/ML
INJECTION, SOLUTION EPIDURAL; INFILTRATION; INTRACAUDAL
Status: COMPLETED | OUTPATIENT
Start: 2022-05-02 | End: 2022-05-02

## 2022-05-02 RX ORDER — HEPARIN SODIUM 10000 [USP'U]/ML
INJECTION, SOLUTION INTRAVENOUS; SUBCUTANEOUS
Status: COMPLETED | OUTPATIENT
Start: 2022-05-02 | End: 2022-05-02

## 2022-05-02 RX ORDER — PROPOFOL 10 MG/ML
INJECTION, EMULSION INTRAVENOUS PRN
Status: DISCONTINUED | OUTPATIENT
Start: 2022-05-02 | End: 2022-05-02 | Stop reason: SDUPTHER

## 2022-05-02 RX ORDER — MIDAZOLAM HYDROCHLORIDE 1 MG/ML
INJECTION INTRAMUSCULAR; INTRAVENOUS PRN
Status: DISCONTINUED | OUTPATIENT
Start: 2022-05-02 | End: 2022-05-02 | Stop reason: SDUPTHER

## 2022-05-02 RX ORDER — SODIUM CHLORIDE 9 MG/ML
INJECTION, SOLUTION INTRAVENOUS CONTINUOUS PRN
Status: DISCONTINUED | OUTPATIENT
Start: 2022-05-02 | End: 2022-05-02 | Stop reason: SDUPTHER

## 2022-05-02 RX ORDER — LIDOCAINE HYDROCHLORIDE 20 MG/ML
INJECTION, SOLUTION INFILTRATION; PERINEURAL
Status: COMPLETED | OUTPATIENT
Start: 2022-05-02 | End: 2022-05-02

## 2022-05-02 RX ORDER — LIDOCAINE HYDROCHLORIDE AND EPINEPHRINE BITARTRATE 20; .01 MG/ML; MG/ML
INJECTION, SOLUTION SUBCUTANEOUS
Status: COMPLETED | OUTPATIENT
Start: 2022-05-02 | End: 2022-05-02

## 2022-05-02 RX ADMIN — HEPARIN SODIUM 10000 UNITS: 10000 INJECTION INTRAVENOUS; SUBCUTANEOUS at 10:43

## 2022-05-02 RX ADMIN — BUPIVACAINE HYDROCHLORIDE 3 ML: 2.5 INJECTION, SOLUTION EPIDURAL; INFILTRATION; INTRACAUDAL; PERINEURAL at 10:33

## 2022-05-02 RX ADMIN — SODIUM CHLORIDE: 9 INJECTION, SOLUTION INTRAVENOUS at 10:20

## 2022-05-02 RX ADMIN — LIDOCAINE HYDROCHLORIDE 16 ML: 20 INJECTION, SOLUTION INFILTRATION; PERINEURAL at 10:30

## 2022-05-02 RX ADMIN — PROPOFOL 20 MG: 10 INJECTION, EMULSION INTRAVENOUS at 10:34

## 2022-05-02 RX ADMIN — MIDAZOLAM 2 MG: 1 INJECTION INTRAMUSCULAR; INTRAVENOUS at 10:30

## 2022-05-02 RX ADMIN — LIDOCAINE HYDROCHLORIDE,EPINEPHRINE BITARTRATE 6 ML: 20; .01 INJECTION, SOLUTION INFILTRATION; PERINEURAL at 10:32

## 2022-05-02 RX ADMIN — PROPOFOL 20 MG: 10 INJECTION, EMULSION INTRAVENOUS at 10:32

## 2022-05-02 RX ADMIN — PROPOFOL 20 MG: 10 INJECTION, EMULSION INTRAVENOUS at 10:30

## 2022-05-02 NOTE — PROCEDURES
1050Patient returned from procedure. Dressing checked, 2 small areas of staining noted, edges marked. Patient stable. No s/s of complications noted or reported. Vitals will be checked q 15min, see flow sheets. With recheck, no changes to staining. Patient eating and drinking well with no s/s of complications noted or reported. 1145Patient discharged, site was checked with every set of vitals. Site clean dry and intact. Discharge papers reviewed with patient, questions answered, discharge paper signed. Patient taken to door. Patient in stable condition, no s/s of complications noted or reported.

## 2022-05-02 NOTE — BRIEF OP NOTE
Brief Postoperative Note      Patient: Emely Williamson  YOB: 1960  MRN: [id number]    Date of Procedure: 5/2/2022    Pre-Op Diagnosis: * Lymphoproliferative disorder    Post-Op Diagnosis: Same       Bone marrow biopsy    Maya Kyle MD    Assistant:  * No surgical staff found *    Anesthesia: *MAC.    ASA: 2*    Estimated Blood Loss (mL): Minimal    Complications: None    Specimens:   Bone marrow    Implants:  * No implants in log *      Drains: * No LDAs found *    Findings: Bone marrow and core    Electronically signed by Maya Kyle MD on 5/2/2022 at 10:52 AM

## 2022-05-02 NOTE — ANESTHESIA POSTPROCEDURE EVALUATION
Department of Anesthesiology  Postprocedure Note    Patient: Kyra Ibarra  MRN: [id number]  YOB: 1960  Date of evaluation: 5/2/2022  Time:  3:04 PM     Procedure Summary     Date: 05/02/22 Room / Location: [address] [place] [address] Procedures; [facility] Radiology    Anesthesia Start: 1005 Anesthesia Stop: 1050    Procedures:       IR FLUORO GUIDED NEEDLE PLACEMENT      IR BONE MARROW BIOPSY AND ASPIRATION Diagnosis:       Myeloma associated amyloidosis ([address]      Monoclonal paraproteinemia      (Myeloma associated amyloidosis ([address]))      (Monoclonal paraproteinemia)      (Myeloma associated amyloidosis ([address]))      (Monoclonal paraproteinemia)    Scheduled Providers: [facility] General Radiologist Responsible Provider: Willie Rawls MD    Anesthesia Type: MAC ASA Status: 2          Anesthesia Type: MAC    Carrie Phase I: Carrie Score: 10    Carrie Phase II: Carrie Score: 10    Last vitals: Reviewed and per EMR flowsheets.        Anesthesia Post Evaluation    Patient location during evaluation: PACU  Patient participation: complete - patient participated  Level of consciousness: awake and alert  Airway patency: patent  Nausea & Vomiting: no nausea and no vomiting  Complications: no  Cardiovascular status: hemodynamically stable and blood pressure returned to baseline  Respiratory status: acceptable  Hydration status: euvolemic

## 2022-05-02 NOTE — ANESTHESIA PRE PROCEDURE
Department of Anesthesiology  Preprocedure Note       Name:  Yasemin Wilkinson   Age:  59 y.o.  :  1960                                          MRN:  [id number]         Date:  2022      Surgeon: * No surgeons listed *    Procedure: CT Bone Marrow Biopsy    Medications prior to admission:   Prior to Admission medications    Medication Sig Start Date End Date Taking? Authorizing Provider   zinc sulfate (ZINCATE) 220 (50 Zn) MG capsule Take 1 capsule by mouth daily 21   Mely Guadalupe MD   ascorbic acid (VITAMIN C) 500 MG tablet Take 1 tablet by mouth daily 21   Mely Guadalupe MD   Multiple Vitamins-Minerals (THERAPEUTIC MULTIVITAMIN-MINERALS) tablet Take 1 tablet by mouth daily    Historical Provider, MD       Current medications:    Current Outpatient Medications   Medication Sig Dispense Refill    zinc sulfate (ZINCATE) 220 (50 Zn) MG capsule Take 1 capsule by mouth daily 30 capsule 3    ascorbic acid (VITAMIN C) 500 MG tablet Take 1 tablet by mouth daily 30 tablet 3    Multiple Vitamins-Minerals (THERAPEUTIC MULTIVITAMIN-MINERALS) tablet Take 1 tablet by mouth daily       No current facility-administered medications for this encounter. Allergies:  No Known Allergies    Problem List:    Patient Active Problem List   Diagnosis Code    Instability of right knee joint M25.361    COVID-19 U07.1       Past Medical History:        Diagnosis Date    Arthritis of back     Hand pain     Knee pain, right     Pneumonia        Past Surgical History:        Procedure Laterality Date     SECTION      x1       Social History:    Social History     Tobacco Use    Smoking status: Never Smoker    Smokeless tobacco: Never Used   Substance Use Topics    Alcohol use:  No                                Counseling given: Not Answered      Vital Signs (Current):   Vitals:    22 0916   BP: 120/63   Pulse: 72   Resp: 18   Temp: 36.4 °C (97.5 °F)   SpO2: 96% BP Readings from Last 3 Encounters:   05/02/22 120/63   05/18/21 114/62   03/05/18 118/79       NPO Status:  >8 hrs                                                                               BMI:   Wt Readings from Last 3 Encounters:   05/12/21 212 lb (96.2 kg)   03/05/18 225 lb (102.1 kg)   02/08/18 217 lb (98.4 kg)     There is no height or weight on file to calculate BMI.    CBC:   Lab Results   Component Value Date    WBC 14.1 05/16/2021    RBC 4.21 05/16/2021    HGB 12.8 05/16/2021    HCT 39.5 05/16/2021    MCV 93.8 05/16/2021    RDW 12.9 05/16/2021     05/16/2021       CMP:   Lab Results   Component Value Date     05/16/2021    K 3.9 05/16/2021     05/16/2021    CO2 23 05/16/2021    BUN 15 05/16/2021    CREATININE 0.7 05/16/2021    GFRAA >60 05/16/2021    LABGLOM >60 05/16/2021    GLUCOSE 159 05/16/2021    GLUCOSE 90 06/07/2011    PROT 7.0 05/16/2021    CALCIUM 8.9 05/16/2021    BILITOT 0.3 05/16/2021    ALKPHOS 53 05/16/2021    AST 39 05/16/2021    ALT 77 05/16/2021       POC Tests: No results for input(s): POCGLU, POCNA, POCK, POCCL, POCBUN, POCHEMO, POCHCT in the last 72 hours.     Coags:   Lab Results   Component Value Date    PROTIME 12.5 05/02/2022    PROTIME 11.8 06/07/2011    INR 1.2 05/02/2022    APTT 30.6 08/02/2015       HCG (If Applicable): No results found for: PREGTESTUR, PREGSERUM, HCG, HCGQUANT     ABGs: No results found for: PHART, PO2ART, EXH7UEO, YZI1YIZ, BEART, R3ZWFREA     Type & Screen (If Applicable):  No results found for: LABABO, LABRH    Drug/Infectious Status (If Applicable):  No results found for: HIV, HEPCAB    COVID-19 Screening (If Applicable):   Lab Results   Component Value Date    COVID19 DETECTED 05/12/2021     EKG 5/15/2021  Sinus bradycardia with short W[place]  Otherwise normal ECG  When compared with ECG of 12-MAY-2021 20:31,  Significant changes have occurred  Confirmed by Ursula Oliveros ([id number]) on 5/16/2021 10:40:25 PM      Anesthesia Evaluation  Patient summary reviewed and Nursing notes reviewed no history of anesthetic complications:   Airway: Mallampati: II  TM distance: >3 FB   Neck ROM: full  Mouth opening: > = 3 FB Dental:          Pulmonary:Negative Pulmonary ROS breath sounds clear to auscultation                            ROS comment: Patient denies   Cardiovascular:Negative CV ROS  Exercise tolerance: good (>4 METS),         ECG reviewed  Rhythm: regular  Rate: normal                 ROS comment: Patient denies     Neuro/Psych:   Negative Neuro/Psych ROS               ROS comment: Patient denies GI/Hepatic/Renal:   (+) GERD:, morbid obesity          Endo/Other:    (+) : arthritis:., .                 Abdominal:   (+) obese,           Vascular: negative vascular ROS. ROS comment: Patient denies. Other Findings:             Anesthesia Plan      MAC     ASA 2       Induction: intravenous. Anesthetic plan and risks discussed with patient. Plan discussed with attending.                   Janet Payne 75   5/2/2022

## 2022-07-08 LAB
Lab: NORMAL
REPORT: NORMAL
THIS TEST SENT TO: NORMAL

## 2022-08-12 LAB — ADDENDUM ELECTROPHORESIS URINE RANDOM: NORMAL

## 2022-09-23 RX ORDER — L-MEFOL/A-CYST/MEB12/ALGAL OIL 6-600-2 MG
TABLET ORAL DAILY
COMMUNITY

## 2022-09-23 RX ORDER — PRAVASTATIN SODIUM 40 MG
40 TABLET ORAL DAILY
COMMUNITY

## 2022-09-23 RX ORDER — DIPHENHYDRAMINE HYDROCHLORIDE 25 MG/1
TABLET ORAL DAILY
COMMUNITY

## 2022-10-21 ENCOUNTER — OFFICE VISIT (OUTPATIENT)
Dept: PRIMARY CARE CLINIC | Age: 62
End: 2022-10-21
Payer: COMMERCIAL

## 2022-10-21 VITALS
TEMPERATURE: 98.7 F | BODY MASS INDEX: 35.2 KG/M2 | HEART RATE: 81 BPM | SYSTOLIC BLOOD PRESSURE: 102 MMHG | HEIGHT: 66 IN | DIASTOLIC BLOOD PRESSURE: 74 MMHG | WEIGHT: 219 LBS | OXYGEN SATURATION: 91 %

## 2022-10-21 DIAGNOSIS — E78.5 SERUM LIPIDS HIGH: ICD-10-CM

## 2022-10-21 DIAGNOSIS — E74.39 GLUCOSE INTOLERANCE: ICD-10-CM

## 2022-10-21 DIAGNOSIS — E66.09 CLASS 1 OBESITY DUE TO EXCESS CALORIES WITHOUT SERIOUS COMORBIDITY WITH BODY MASS INDEX (BMI) OF 32.0 TO 32.9 IN ADULT: Primary | ICD-10-CM

## 2022-10-21 DIAGNOSIS — C90.01 MULTIPLE MYELOMA IN REMISSION (HCC): ICD-10-CM

## 2022-10-21 PROBLEM — U07.1 COVID-19: Status: RESOLVED | Noted: 2021-05-13 | Resolved: 2022-10-21

## 2022-10-21 LAB — HBA1C MFR BLD: 5.7 %

## 2022-10-21 PROCEDURE — 83036 HEMOGLOBIN GLYCOSYLATED A1C: CPT | Performed by: INTERNAL MEDICINE

## 2022-10-21 PROCEDURE — 99214 OFFICE O/P EST MOD 30 MIN: CPT | Performed by: INTERNAL MEDICINE

## 2022-10-21 RX ORDER — PRAVASTATIN SODIUM 40 MG
40 TABLET ORAL DAILY
Qty: 90 TABLET | Refills: 0 | Status: CANCELLED | OUTPATIENT
Start: 2022-10-21

## 2022-10-21 RX ORDER — SULFAMETHOXAZOLE AND TRIMETHOPRIM 800; 160 MG/1; MG/1
1 TABLET ORAL 2 TIMES DAILY
COMMUNITY
Start: 2022-09-20

## 2022-10-21 RX ORDER — ACYCLOVIR 400 MG/1
1 TABLET ORAL 2 TIMES DAILY
COMMUNITY
Start: 2022-05-23

## 2022-10-21 RX ORDER — DEXAMETHASONE 4 MG/1
10 TABLET ORAL WEEKLY
COMMUNITY
Start: 2022-09-19

## 2022-10-21 ASSESSMENT — ENCOUNTER SYMPTOMS
BLOOD IN STOOL: 0
ABDOMINAL PAIN: 0
SORE THROAT: 0
VOMITING: 0
NAUSEA: 0
COLOR CHANGE: 0
RHINORRHEA: 0
SINUS PRESSURE: 0
TROUBLE SWALLOWING: 0
CONSTIPATION: 0
BACK PAIN: 0
DIARRHEA: 0
ALLERGIC/IMMUNOLOGIC NEGATIVE: 1
ABDOMINAL DISTENTION: 0

## 2022-10-21 NOTE — PROGRESS NOTES
Abraham Spring presents today for follow up of Borderline Diabetes, HTN, High lipids, Multiple Myeloma    Current Outpatient Medications   Medication Sig Dispense Refill    sulfamethoxazole-trimethoprim (BACTRIM DS;SEPTRA DS) 800-160 MG per tablet Take 1 tablet by mouth 2 times daily Only on Mondays and Thursdays      acyclovir (ZOVIRAX) 400 MG tablet Take 1 tablet by mouth in the morning and at bedtime      dexamethasone (DECADRON) 4 MG tablet Take 10 tablets by mouth once a week      mirabegron (MYRBETRIQ) 50 MG TB24 Take 50 mg by mouth daily      pravastatin (PRAVACHOL) 40 MG tablet Take 40 mg by mouth daily      Biotin 5 MG CAPS Take by mouth daily (Patient not taking: Reported on 10/21/2022)      Methylfol-Algae-K66-Jttvauqlef (L-METHYLFOLATE CA ME-CBL NAC) 6-90.314-2-600 MG TABS Take by mouth daily (Patient not taking: Reported on 10/21/2022)       No current facility-administered medications for this visit. Past Medical History:   Diagnosis Date    Arthritis of back     COVID     COVID-19 5/13/2021    DJD (degenerative joint disease)     Hand pain     Hypertension     Knee pain, right     Monoclonal gammopathy     Multiple myeloma in remission ([facility] [address] 10/21/2022    OAB (overactive bladder)     Obesity     Pneumonia     Ventral hernia           Subjective:  AS above. Had COVID for the second time last month, mostly cold like sxs. Recuperated, went back to work after a week. Continues chemo once a week for her Multiple Myeloma, tolerating well. Still pending for bone marrow transplant, maybe FeB of next year. Appetite is good. Continues to work full time. Review of Systems   Constitutional:  Positive for fatigue. Negative for activity change, appetite change and chills. HENT:  Negative for congestion, ear pain, mouth sores, postnasal drip, rhinorrhea, sinus pressure, sneezing, sore throat and trouble swallowing. Eyes:  Negative for visual disturbance.    Cardiovascular:  Negative for chest pain, palpitations and leg swelling. Gastrointestinal:  Negative for abdominal distention, abdominal pain, blood in stool, constipation, diarrhea, nausea and vomiting. Endocrine: Negative for cold intolerance, heat intolerance, polydipsia and polyuria. Genitourinary:  Negative for difficulty urinating, dysuria, flank pain, frequency and urgency. Musculoskeletal:  Negative for arthralgias, back pain, gait problem, neck pain and neck stiffness. Skin: Negative. Negative for color change. Allergic/Immunologic: Negative. Neurological:  Negative for dizziness, tremors, speech difficulty, weakness, light-headedness and headaches. Hematological: Negative. Psychiatric/Behavioral: Negative. Objective:  /74 (Site: Left Upper Arm, Position: Sitting, Cuff Size: Large Adult)   Pulse 81   Temp 98.7 °F (37.1 °C)   Ht 5' 6\" (1.676 m)   Wt 219 lb (99.3 kg)   SpO2 91%   BMI 35.35 kg/m²      Physical Exam  HENT:      Head: Normocephalic. Right Ear: Tympanic membrane and external ear normal. There is no impacted cerumen. Left Ear: Tympanic membrane and external ear normal. There is no impacted cerumen. Nose: Nose normal.      Mouth/Throat:      Pharynx: Oropharynx is clear. No oropharyngeal exudate. Eyes:      Extraocular Movements: Extraocular movements intact. Conjunctiva/sclera: Conjunctivae normal.      Pupils: Pupils are equal, round, and reactive to light. Cardiovascular:      Rate and Rhythm: Normal rate and regular rhythm. Heart sounds: No murmur heard. No friction rub. No gallop. Pulmonary:      Effort: Pulmonary effort is normal.      Breath sounds: Normal breath sounds. Abdominal:      General: Bowel sounds are normal. There is no distension. Palpations: Abdomen is soft. There is no mass. Tenderness: There is no abdominal tenderness. There is no guarding or rebound. Musculoskeletal:         General: No tenderness or deformity.  Swelling: 1 pitting edema both legs. Normal range of motion. Cervical back: Normal range of motion and neck supple. No tenderness. Lymphadenopathy:      Cervical: No cervical adenopathy. Skin:     General: Skin is warm. Coloration: Skin is not pale. Findings: No rash. Neurological:      General: No focal deficit present. Mental Status: She is alert and oriented to person, place, and time. Assessment:  Elicia Pardo was seen today for cholesterol problem. Diagnoses and all orders for this visit:    Class 1 obesity due to excess calories without serious comorbidity with body mass index (BMI) of 32.0 to 32.9 in adult    [facility] myel[facility] in remission [facility])  Comments:  Sees oncology, still on Chemo, awaiting for bone marrow transplant  Orders:  -     Comprehensive Metabolic Panel, Fasting; Future    Glucose intolerance  Comments:  REcheck A1C today. Discussed need for wtloss and no added sugar diet. Orders:  -     POCT glycosylated hemoglobin (Hb A1C)    Serum lipids high  Comments: For no reason stopped taking statin. Rechecked level today. Orders:  -     LIPID PANEL;  Future

## 2023-01-24 ENCOUNTER — OFFICE VISIT (OUTPATIENT)
Dept: PRIMARY CARE CLINIC | Age: 63
End: 2023-01-24
Payer: COMMERCIAL

## 2023-01-24 VITALS
HEIGHT: 66 IN | DIASTOLIC BLOOD PRESSURE: 85 MMHG | WEIGHT: 223 LBS | HEART RATE: 98 BPM | SYSTOLIC BLOOD PRESSURE: 134 MMHG | OXYGEN SATURATION: 96 % | RESPIRATION RATE: 16 BRPM | BODY MASS INDEX: 35.84 KG/M2 | TEMPERATURE: 97.3 F

## 2023-01-24 DIAGNOSIS — E74.39 GLUCOSE INTOLERANCE: ICD-10-CM

## 2023-01-24 DIAGNOSIS — C90.01 MULTIPLE MYELOMA IN REMISSION (HCC): Primary | ICD-10-CM

## 2023-01-24 DIAGNOSIS — M25.361 INSTABILITY OF RIGHT KNEE JOINT: ICD-10-CM

## 2023-01-24 DIAGNOSIS — T78.3XXA ANGIOEDEMA, INITIAL ENCOUNTER: ICD-10-CM

## 2023-01-24 PROCEDURE — 99214 OFFICE O/P EST MOD 30 MIN: CPT | Performed by: INTERNAL MEDICINE

## 2023-01-24 SDOH — ECONOMIC STABILITY: FOOD INSECURITY: WITHIN THE PAST 12 MONTHS, THE FOOD YOU BOUGHT JUST DIDN'T LAST AND YOU DIDN'T HAVE MONEY TO GET MORE.: NEVER TRUE

## 2023-01-24 SDOH — ECONOMIC STABILITY: FOOD INSECURITY: WITHIN THE PAST 12 MONTHS, YOU WORRIED THAT YOUR FOOD WOULD RUN OUT BEFORE YOU GOT MONEY TO BUY MORE.: NEVER TRUE

## 2023-01-24 ASSESSMENT — PATIENT HEALTH QUESTIONNAIRE - PHQ9
SUM OF ALL RESPONSES TO PHQ9 QUESTIONS 1 & 2: 0
SUM OF ALL RESPONSES TO PHQ QUESTIONS 1-9: 0
1. LITTLE INTEREST OR PLEASURE IN DOING THINGS: 0
2. FEELING DOWN, DEPRESSED OR HOPELESS: 0
SUM OF ALL RESPONSES TO PHQ QUESTIONS 1-9: 0

## 2023-01-24 ASSESSMENT — SOCIAL DETERMINANTS OF HEALTH (SDOH): HOW HARD IS IT FOR YOU TO PAY FOR THE VERY BASICS LIKE FOOD, HOUSING, MEDICAL CARE, AND HEATING?: SOMEWHAT HARD

## 2023-01-24 NOTE — PROGRESS NOTES
Bonita Magallon presents today for follow up of Multiple Myeloma, High chol, Obesity. Current Outpatient Medications   Medication Sig Dispense Refill    sulfamethoxazole-trimethoprim (BACTRIM DS;SEPTRA DS) 800-160 MG per tablet Take 1 tablet by mouth 2 times daily Only on Mondays and Thursdays      acyclovir (ZOVIRAX) 400 MG tablet Take 1 tablet by mouth in the morning and at bedtime      dexamethasone (DECADRON) 4 MG tablet Take 10 tablets by mouth once a week      mirabegron (MYRBETRIQ) 50 MG TB24 Take 50 mg by mouth daily      pravastatin (PRAVACHOL) 40 MG tablet Take 40 mg by mouth daily      Biotin 5 MG CAPS Take by mouth daily (Patient not taking: No sig reported)      Methylfol-Algae-T24-Tgpkzyjksg (L-METHYLFOLATE CA ME-CBL NAC) 6-90.314-2-600 MG TABS Take by mouth daily (Patient not taking: No sig reported)       No current facility-administered medications for this visit. Past Medical History:   Diagnosis Date    Arthritis of back     COVID     COVID-19 5/13/2021    DJD (degenerative joint disease)     Hand pain     Hypertension     Knee pain, right     Monoclonal gammopathy     Multiple myeloma in remission ([facility] [address] 10/21/2022    OAB (overactive bladder)     Obesity     Pneumonia     Ventral hernia           Subjective:  Continues weekly injections. Makes her very tired. Noticed on a week that they skipped tx she had so much more energy and stamina. Sometimes has to struggle at work to make her whole day. Spontaneous swelling of eyes. One at the time. Has had 3 episodes, start with feeling itchy/uncomfortable then the whole eye gets very swollen and red for a few days. No new makeup or personal products. Unsure if this is related to the shot she is getting for MM. RT Knee still hurts. Contemplating Bone Marrow transplant at the [facility] in the next few months. Eye Problem   Pertinent negatives include no nausea, vomiting or weakness.     Review of Systems   Constitutional:  Positive for fatigue. Negative for activity change, appetite change and chills.   HENT:  Negative for congestion, ear pain, mouth sores, postnasal drip, rhinorrhea, sinus pressure, sneezing, sore throat and trouble swallowing.    Eyes:  Negative for visual disturbance.        Swelling   Cardiovascular:  Negative for chest pain, palpitations and leg swelling.   Gastrointestinal:  Negative for abdominal distention, abdominal pain, blood in stool, constipation, diarrhea, nausea and vomiting.   Endocrine: Negative for cold intolerance, heat intolerance, polydipsia and polyuria.   Genitourinary:  Negative for difficulty urinating, dysuria, flank pain, frequency and urgency.   Musculoskeletal:  Positive for arthralgias (rt). Negative for back pain, gait problem, neck pain and neck stiffness.   Skin: Negative.  Negative for color change.   Allergic/Immunologic: Negative.    Neurological:  Negative for dizziness, tremors, speech difficulty, weakness, light-headedness and headaches.   Hematological: Negative.    Psychiatric/Behavioral: Negative.          Objective:  /85   Pulse 98   Temp 97.3 °F (36.3 °C)   Resp 16   Ht 5' 6\" (1.676 m)   Wt 223 lb (101.2 kg)   SpO2 96%   BMI 35.99 kg/m²      Physical Exam  Vitals reviewed.   Constitutional:       Appearance: She is obese.   HENT:      Head: Normocephalic.      Right Ear: Tympanic membrane and external ear normal. There is no impacted cerumen.      Left Ear: Tympanic membrane and external ear normal. There is no impacted cerumen.      Nose: Nose normal.      Mouth/Throat:      Pharynx: Oropharynx is clear. No oropharyngeal exudate.   Eyes:      Extraocular Movements: Extraocular movements intact.      Conjunctiva/sclera: Conjunctivae normal.      Pupils: Pupils are equal, round, and reactive to light.   Cardiovascular:      Rate and Rhythm: Normal rate and regular rhythm.      Heart sounds: No murmur heard.    No friction rub. No gallop.   Pulmonary:      Effort: Pulmonary  effort is normal.      Breath sounds: Normal breath sounds. Abdominal:      General: Bowel sounds are normal. There is no distension. Palpations: Abdomen is soft. There is no mass. Tenderness: There is no abdominal tenderness. There is no guarding or rebound. Musculoskeletal:         General: No swelling, tenderness or deformity. Normal range of motion. Cervical back: Normal range of motion and neck supple. No tenderness. Lymphadenopathy:      Cervical: No cervical adenopathy. Skin:     General: Skin is warm. Coloration: Skin is not pale. Findings: No rash. Neurological:      General: No focal deficit present. Mental Status: She is alert and oriented to person, place, and time. Assessment:  Yunior Miller was seen today for eye problem. Diagnoses and all orders for this visit:    Multiple myeloma in remission [facility])  Comments:  Sees Oncology. Waiting for possible Bone Marrow transplant. Glucose intolerance  Comments:  Reminded of low sugar/low carb diet, recheck A1C on next appt. Instability of right knee joint  Comments:  Severe DJD rt knee, procedure on hold due to new Dx of MM    Angioedema, initial encounter  Comments:  Possible Angioedema of eyes, etiol? Advised to use Benadryl and ice next time at the first sxs of itch. Let us know if unsuccesful.

## 2023-01-25 ASSESSMENT — ENCOUNTER SYMPTOMS
RHINORRHEA: 0
NAUSEA: 0
BLOOD IN STOOL: 0
TROUBLE SWALLOWING: 0
ABDOMINAL DISTENTION: 0
SORE THROAT: 0
COLOR CHANGE: 0
CONSTIPATION: 0
DIARRHEA: 0
BACK PAIN: 0
ALLERGIC/IMMUNOLOGIC NEGATIVE: 1
VOMITING: 0
SINUS PRESSURE: 0
ABDOMINAL PAIN: 0

## 2023-02-01 LAB — ADDENDUM ELECTROPHORESIS URINE RANDOM: NORMAL

## 2023-03-22 ENCOUNTER — OFFICE VISIT (OUTPATIENT)
Dept: PRIMARY CARE CLINIC | Age: 63
End: 2023-03-22
Payer: COMMERCIAL

## 2023-03-22 VITALS
OXYGEN SATURATION: 98 % | HEART RATE: 97 BPM | HEIGHT: 66 IN | SYSTOLIC BLOOD PRESSURE: 116 MMHG | BODY MASS INDEX: 35.52 KG/M2 | WEIGHT: 221 LBS | DIASTOLIC BLOOD PRESSURE: 62 MMHG

## 2023-03-22 DIAGNOSIS — J06.9 URTI (ACUTE UPPER RESPIRATORY INFECTION): ICD-10-CM

## 2023-03-22 DIAGNOSIS — E66.9 OBESITY (BMI 35.0-39.9 WITHOUT COMORBIDITY): ICD-10-CM

## 2023-03-22 DIAGNOSIS — E74.39 GLUCOSE INTOLERANCE: ICD-10-CM

## 2023-03-22 DIAGNOSIS — C90.01 MULTIPLE MYELOMA IN REMISSION (HCC): Primary | ICD-10-CM

## 2023-03-22 PROCEDURE — 99214 OFFICE O/P EST MOD 30 MIN: CPT | Performed by: INTERNAL MEDICINE

## 2023-03-22 SDOH — ECONOMIC STABILITY: HOUSING INSECURITY
IN THE LAST 12 MONTHS, WAS THERE A TIME WHEN YOU DID NOT HAVE A STEADY PLACE TO SLEEP OR SLEPT IN A SHELTER (INCLUDING NOW)?: NO

## 2023-03-22 SDOH — ECONOMIC STABILITY: FOOD INSECURITY: WITHIN THE PAST 12 MONTHS, YOU WORRIED THAT YOUR FOOD WOULD RUN OUT BEFORE YOU GOT MONEY TO BUY MORE.: NEVER TRUE

## 2023-03-22 SDOH — ECONOMIC STABILITY: INCOME INSECURITY: HOW HARD IS IT FOR YOU TO PAY FOR THE VERY BASICS LIKE FOOD, HOUSING, MEDICAL CARE, AND HEATING?: NOT VERY HARD

## 2023-03-22 SDOH — ECONOMIC STABILITY: FOOD INSECURITY: WITHIN THE PAST 12 MONTHS, THE FOOD YOU BOUGHT JUST DIDN'T LAST AND YOU DIDN'T HAVE MONEY TO GET MORE.: NEVER TRUE

## 2023-03-22 ASSESSMENT — ENCOUNTER SYMPTOMS
BLOOD IN STOOL: 0
SORE THROAT: 0
TROUBLE SWALLOWING: 0
NAUSEA: 0
ABDOMINAL DISTENTION: 0
DIARRHEA: 0
COLOR CHANGE: 0
SINUS PRESSURE: 0
BACK PAIN: 0
RHINORRHEA: 0
CONSTIPATION: 0
ALLERGIC/IMMUNOLOGIC NEGATIVE: 1
ABDOMINAL PAIN: 0
VOMITING: 0

## 2023-03-22 NOTE — PROGRESS NOTES
Brenda Teixeira presents today for follow up of Multiple Myeloma, has some cold sxs. Current Outpatient Medications   Medication Sig Dispense Refill    sulfamethoxazole-trimethoprim (BACTRIM DS;SEPTRA DS) 800-160 MG per tablet Take 1 tablet by mouth 2 times daily Only on Mondays and Thursdays      dexamethasone (DECADRON) 4 MG tablet Take 10 tablets by mouth once a week      mirabegron (MYRBETRIQ) 50 MG TB24 Take 50 mg by mouth daily      pravastatin (PRAVACHOL) 40 MG tablet Take 40 mg by mouth daily      Methylfol-Algae-M46-Tfpoatmysa (L-METHYLFOLATE CA ME-CBL NAC) 6-90.314-2-600 MG TABS Take by mouth daily (Patient not taking: No sig reported)       No current facility-administered medications for this visit. Past Medical History:   Diagnosis Date    Arthritis of back     COVID     COVID-19 5/13/2021    DJD (degenerative joint disease)     Hand pain     Hypertension     Knee pain, right     Monoclonal gammopathy     Multiple myeloma in remission ([facility] [address] 10/21/2022    OAB (overactive bladder)     Obesity     Pneumonia     Ventral hernia           Subjective:  Slight sinus congestion and mild sore throat/swollen glands since yesterday. No fever or cough. Saw oncologist yesterday. Starting process of bone marrow transplant at the [facility] next week. Overall has been doing fine. Review of Systems   Constitutional:  Negative for activity change, appetite change and chills. HENT:  Positive for congestion. Negative for ear pain, mouth sores, postnasal drip, rhinorrhea, sinus pressure, sneezing, sore throat and trouble swallowing. Eyes:  Negative for visual disturbance. Cardiovascular:  Negative for chest pain, palpitations and leg swelling. Gastrointestinal:  Negative for abdominal distention, abdominal pain, blood in stool, constipation, diarrhea, nausea and vomiting. Endocrine: Negative for cold intolerance, heat intolerance, polydipsia and polyuria.    Genitourinary:  Negative for difficulty

## 2023-06-23 ENCOUNTER — OFFICE VISIT (OUTPATIENT)
Dept: PRIMARY CARE CLINIC | Age: 63
End: 2023-06-23
Payer: COMMERCIAL

## 2023-06-23 VITALS
BODY MASS INDEX: 33.91 KG/M2 | SYSTOLIC BLOOD PRESSURE: 108 MMHG | WEIGHT: 211 LBS | HEART RATE: 116 BPM | DIASTOLIC BLOOD PRESSURE: 64 MMHG | TEMPERATURE: 97.2 F | OXYGEN SATURATION: 98 % | HEIGHT: 66 IN

## 2023-06-23 DIAGNOSIS — E78.00 HIGH CHOLESTEROL: ICD-10-CM

## 2023-06-23 DIAGNOSIS — C90.01 MULTIPLE MYELOMA IN REMISSION (HCC): Primary | ICD-10-CM

## 2023-06-23 DIAGNOSIS — E66.9 OBESITY (BMI 35.0-39.9 WITHOUT COMORBIDITY): ICD-10-CM

## 2023-06-23 DIAGNOSIS — E74.39 GLUCOSE INTOLERANCE: ICD-10-CM

## 2023-06-23 PROCEDURE — 99214 OFFICE O/P EST MOD 30 MIN: CPT | Performed by: INTERNAL MEDICINE

## 2023-06-23 RX ORDER — POTASSIUM CHLORIDE 750 MG/1
1 CAPSULE, EXTENDED RELEASE ORAL 2 TIMES DAILY
COMMUNITY
Start: 2023-06-06

## 2023-06-23 RX ORDER — LOPERAMIDE HYDROCHLORIDE 2 MG/1
2 CAPSULE ORAL 3 TIMES DAILY PRN
Qty: 90 CAPSULE | Refills: 0 | Status: SHIPPED | OUTPATIENT
Start: 2023-06-23

## 2023-06-23 RX ORDER — LOPERAMIDE HYDROCHLORIDE 2 MG/1
1 CAPSULE ORAL 3 TIMES DAILY PRN
COMMUNITY
Start: 2023-06-12 | End: 2023-06-23 | Stop reason: SDUPTHER

## 2023-06-23 RX ORDER — ACYCLOVIR 400 MG/1
400 TABLET ORAL 2 TIMES DAILY
COMMUNITY
Start: 2023-04-03

## 2023-06-23 RX ORDER — OLANZAPINE 5 MG/1
1 TABLET, ORALLY DISINTEGRATING ORAL NIGHTLY
COMMUNITY
Start: 2023-06-12

## 2023-06-23 RX ORDER — PANTOPRAZOLE SODIUM 40 MG/1
1 TABLET, DELAYED RELEASE ORAL DAILY
COMMUNITY
Start: 2023-06-06

## 2023-06-27 PROBLEM — E78.00 HIGH CHOLESTEROL: Status: ACTIVE | Noted: 2023-06-27

## 2023-06-27 ASSESSMENT — ENCOUNTER SYMPTOMS
COLOR CHANGE: 0
BACK PAIN: 0
CONSTIPATION: 0
RHINORRHEA: 0
NAUSEA: 0
ABDOMINAL DISTENTION: 0
DIARRHEA: 1
ALLERGIC/IMMUNOLOGIC NEGATIVE: 1
SINUS PRESSURE: 0
ABDOMINAL PAIN: 0
TROUBLE SWALLOWING: 0
SORE THROAT: 0
VOMITING: 0
BLOOD IN STOOL: 0

## 2023-09-29 ENCOUNTER — OFFICE VISIT (OUTPATIENT)
Dept: PRIMARY CARE CLINIC | Age: 63
End: 2023-09-29
Payer: COMMERCIAL

## 2023-09-29 VITALS
DIASTOLIC BLOOD PRESSURE: 80 MMHG | BODY MASS INDEX: 34.23 KG/M2 | HEART RATE: 84 BPM | OXYGEN SATURATION: 97 % | RESPIRATION RATE: 16 BRPM | HEIGHT: 66 IN | SYSTOLIC BLOOD PRESSURE: 120 MMHG | TEMPERATURE: 97.8 F | WEIGHT: 213 LBS

## 2023-09-29 DIAGNOSIS — E78.00 HIGH CHOLESTEROL: ICD-10-CM

## 2023-09-29 DIAGNOSIS — E66.9 OBESITY (BMI 35.0-39.9 WITHOUT COMORBIDITY): ICD-10-CM

## 2023-09-29 DIAGNOSIS — C90.01 MULTIPLE MYELOMA IN REMISSION (HCC): Primary | ICD-10-CM

## 2023-09-29 DIAGNOSIS — E74.39 GLUCOSE INTOLERANCE: ICD-10-CM

## 2023-09-29 DIAGNOSIS — I10 PRIMARY HYPERTENSION: ICD-10-CM

## 2023-09-29 DIAGNOSIS — J06.9 URTI (ACUTE UPPER RESPIRATORY INFECTION): ICD-10-CM

## 2023-09-29 PROCEDURE — 99213 OFFICE O/P EST LOW 20 MIN: CPT | Performed by: INTERNAL MEDICINE

## 2023-09-29 PROCEDURE — 1123F ACP DISCUSS/DSCN MKR DOCD: CPT | Performed by: INTERNAL MEDICINE

## 2023-09-29 PROCEDURE — 3078F DIAST BP <80 MM HG: CPT | Performed by: INTERNAL MEDICINE

## 2023-09-29 PROCEDURE — 3074F SYST BP LT 130 MM HG: CPT | Performed by: INTERNAL MEDICINE

## 2023-09-29 NOTE — PROGRESS NOTES
Krystal Fischer presents today for follow up of Multiple Myeloma, High chol, Gerd    Current Outpatient Medications   Medication Sig Dispense Refill    acyclovir (ZOVIRAX) 400 MG tablet Take 1 tablet by mouth 2 times daily      OLANZapine zydis (ZYPREXA) 5 MG disintegrating tablet Place 1 tablet under the tongue nightly      pantoprazole (PROTONIX) 40 MG tablet Take 1 tablet by mouth daily      potassium chloride (MICRO-K) 10 MEQ extended release capsule Take 1 capsule by mouth in the morning and at bedtime      loperamide (IMODIUM) 2 MG capsule Take 1 capsule by mouth 3 times daily as needed for Diarrhea 90 capsule 0    sulfamethoxazole-trimethoprim (BACTRIM DS;SEPTRA DS) 800-160 MG per tablet Take 1 tablet by mouth 2 times daily Only on Mondays and Thursdays      dexamethasone (DECADRON) 4 MG tablet Take 10 tablets by mouth once a week      mirabegron (MYRBETRIQ) 50 MG TB24 Take 50 mg by mouth daily      pravastatin (PRAVACHOL) 40 MG tablet Take 1 tablet by mouth daily       No current facility-administered medications for this visit. Past Medical History:   Diagnosis Date    Arthritis of back     COVID     COVID-19 5/13/2021    DJD (degenerative joint disease)     Hand pain     High cholesterol 6/27/2023    Hypertension     Knee pain, right     Monoclonal gammopathy     Multiple myeloma in remission ([address]) 10/21/2022    OAB (overactive bladder)     Obesity     Pneumonia     Ventral hernia           Subjective:  Has had a cold for 2 weeks. COVID test was neg. Still has cough, sounds like if she has some phlegm but noting comes up. No fever, no sob. Taking shots for Osteoporosis. Good appetite, no issues with bp. Ambulatory. Continues treatment for Multiple Myeloma after Bone Marrow transplant, tolerating well. Has been able to go back to work part time. Review of Systems   Constitutional:  Positive for fatigue. Negative for activity change, appetite change and chills.    HENT:  Negative for

## 2023-10-02 ASSESSMENT — ENCOUNTER SYMPTOMS
ABDOMINAL PAIN: 0
DIARRHEA: 0
VOMITING: 0
ABDOMINAL DISTENTION: 0
TROUBLE SWALLOWING: 0
CONSTIPATION: 0
BLOOD IN STOOL: 0
COLOR CHANGE: 0
NAUSEA: 0
ALLERGIC/IMMUNOLOGIC NEGATIVE: 1
BACK PAIN: 0
SINUS PRESSURE: 0
SORE THROAT: 0
COUGH: 1
RHINORRHEA: 0

## 2023-10-11 ENCOUNTER — OFFICE VISIT (OUTPATIENT)
Dept: PRIMARY CARE CLINIC | Age: 63
End: 2023-10-11
Payer: COMMERCIAL

## 2023-10-11 VITALS
HEART RATE: 103 BPM | DIASTOLIC BLOOD PRESSURE: 62 MMHG | WEIGHT: 206 LBS | HEIGHT: 66 IN | OXYGEN SATURATION: 96 % | SYSTOLIC BLOOD PRESSURE: 98 MMHG | TEMPERATURE: 97.2 F | BODY MASS INDEX: 33.11 KG/M2

## 2023-10-11 DIAGNOSIS — I10 PRIMARY HYPERTENSION: Primary | ICD-10-CM

## 2023-10-11 DIAGNOSIS — J32.9 SINUSITIS, UNSPECIFIED CHRONICITY, UNSPECIFIED LOCATION: ICD-10-CM

## 2023-10-11 DIAGNOSIS — C90.01 MULTIPLE MYELOMA IN REMISSION (HCC): ICD-10-CM

## 2023-10-11 PROBLEM — Z94.81 S/P AUTOLOGOUS BONE MARROW TRANSPLANTATION (HCC): Status: ACTIVE | Noted: 2023-05-23

## 2023-10-11 PROBLEM — R76.8 RED BLOOD CELL ANTIBODY POSITIVE, COMPATIBLE PRBC DIFFICULT TO OBTAIN: Status: ACTIVE | Noted: 2023-03-20

## 2023-10-11 PROBLEM — R19.7 DIARRHEA: Status: ACTIVE | Noted: 2023-05-28

## 2023-10-11 PROBLEM — R60.0 LOCALIZED EDEMA: Status: ACTIVE | Noted: 2023-03-06

## 2023-10-11 PROBLEM — D61.810 PANCYTOPENIA DUE TO CHEMOTHERAPY (HCC): Status: ACTIVE | Noted: 2023-05-23

## 2023-10-11 PROCEDURE — 3078F DIAST BP <80 MM HG: CPT | Performed by: INTERNAL MEDICINE

## 2023-10-11 PROCEDURE — 1123F ACP DISCUSS/DSCN MKR DOCD: CPT | Performed by: INTERNAL MEDICINE

## 2023-10-11 PROCEDURE — 99213 OFFICE O/P EST LOW 20 MIN: CPT | Performed by: INTERNAL MEDICINE

## 2023-10-11 PROCEDURE — 3074F SYST BP LT 130 MM HG: CPT | Performed by: INTERNAL MEDICINE

## 2023-10-11 RX ORDER — BROMPHENIRAMINE MALEATE, PSEUDOEPHEDRINE HYDROCHLORIDE, AND DEXTROMETHORPHAN HYDROBROMIDE 2; 30; 10 MG/5ML; MG/5ML; MG/5ML
5 SYRUP ORAL 4 TIMES DAILY PRN
Qty: 120 ML | Refills: 1 | Status: SHIPPED | OUTPATIENT
Start: 2023-10-11

## 2023-10-11 RX ORDER — AMOXICILLIN AND CLAVULANATE POTASSIUM 875; 125 MG/1; MG/1
1 TABLET, FILM COATED ORAL 2 TIMES DAILY
Qty: 20 TABLET | Refills: 0 | Status: SHIPPED | OUTPATIENT
Start: 2023-10-11 | End: 2023-10-21

## 2023-10-11 NOTE — PROGRESS NOTES
Nichol Bloom presents today for cold sxs for one month    Current Outpatient Medications   Medication Sig Dispense Refill    amoxicillin-clavulanate (AUGMENTIN) 875-125 MG per tablet Take 1 tablet by mouth 2 times daily for 10 days 20 tablet 0    brompheniramine-pseudoephedrine-DM 2-30-10 MG/5ML syrup Take 5 mLs by mouth 4 times daily as needed for Congestion or Cough 120 mL 1    acyclovir (ZOVIRAX) 400 MG tablet Take 1 tablet by mouth 2 times daily      OLANZapine zydis (ZYPREXA) 5 MG disintegrating tablet Place 1 tablet under the tongue nightly      pantoprazole (PROTONIX) 40 MG tablet Take 1 tablet by mouth daily      potassium chloride (MICRO-K) 10 MEQ extended release capsule Take 1 capsule by mouth in the morning and at bedtime      loperamide (IMODIUM) 2 MG capsule Take 1 capsule by mouth 3 times daily as needed for Diarrhea 90 capsule 0    sulfamethoxazole-trimethoprim (BACTRIM DS;SEPTRA DS) 800-160 MG per tablet Take 1 tablet by mouth 2 times daily Only on Mondays and Thursdays      dexamethasone (DECADRON) 4 MG tablet Take 10 tablets by mouth once a week      mirabegron (MYRBETRIQ) 50 MG TB24 Take 50 mg by mouth daily      pravastatin (PRAVACHOL) 40 MG tablet Take 1 tablet by mouth daily       No current facility-administered medications for this visit. Past Medical History:   Diagnosis Date    Arthritis of back     COVID     COVID-19 5/13/2021    DJD (degenerative joint disease)     Hand pain     High cholesterol 6/27/2023    Hypertension     Knee pain, right     Monoclonal gammopathy     Multiple myeloma in remission ([address]) 10/21/2022    OAB (overactive bladder)     Obesity     Pneumonia     Ventral hernia           Subjective: Worsening cold sxs, for one month. Cough, headache, head congestion. No fever, no sob. On no meds. Has not been able to work the last couple of days. Review of Systems   Constitutional:  Negative for activity change, appetite change and chills.    HENT:  Positive

## 2023-10-13 ASSESSMENT — ENCOUNTER SYMPTOMS
SORE THROAT: 0
TROUBLE SWALLOWING: 0
NAUSEA: 0
DIARRHEA: 0
VOMITING: 0
BACK PAIN: 0
COLOR CHANGE: 0
CONSTIPATION: 0
ABDOMINAL PAIN: 0
BLOOD IN STOOL: 0
ABDOMINAL DISTENTION: 0
RHINORRHEA: 0
ALLERGIC/IMMUNOLOGIC NEGATIVE: 1
SINUS PRESSURE: 1
COUGH: 1

## 2023-10-25 LAB
P E INTERPRETATION, U: NORMAL
PATHOLOGIST: NORMAL
SPECIMEN TYPE: NORMAL
TOTAL PROTEIN EXCRETED, URINE: 58 MG/24 H (ref 30–150)

## 2023-12-01 ENCOUNTER — OFFICE VISIT (OUTPATIENT)
Dept: FAMILY MEDICINE CLINIC | Age: 63
End: 2023-12-01

## 2023-12-01 VITALS
SYSTOLIC BLOOD PRESSURE: 120 MMHG | TEMPERATURE: 98.1 F | WEIGHT: 202 LBS | BODY MASS INDEX: 32.6 KG/M2 | OXYGEN SATURATION: 94 % | HEART RATE: 105 BPM | DIASTOLIC BLOOD PRESSURE: 68 MMHG

## 2023-12-01 DIAGNOSIS — R05.9 COUGH, UNSPECIFIED TYPE: ICD-10-CM

## 2023-12-01 DIAGNOSIS — J20.5 RSV BRONCHITIS: Primary | ICD-10-CM

## 2023-12-01 DIAGNOSIS — J06.9 ACUTE UPPER RESPIRATORY INFECTION, UNSPECIFIED: ICD-10-CM

## 2023-12-01 DIAGNOSIS — R09.81 NASAL CONGESTION: ICD-10-CM

## 2023-12-01 DIAGNOSIS — R09.82 POSTNASAL DRIP: ICD-10-CM

## 2023-12-01 LAB
INFLUENZA A ANTIBODY: NEGATIVE
INFLUENZA B ANTIBODY: NEGATIVE
Lab: NORMAL
PERFORMING INSTRUMENT: NORMAL
QC PASS/FAIL: NORMAL
RSV ANTIGEN: POSITIVE
SARS-COV-2, POC: NORMAL

## 2023-12-01 RX ORDER — PREDNISONE 10 MG/1
TABLET ORAL
Qty: 18 TABLET | Refills: 0 | Status: SHIPPED | OUTPATIENT
Start: 2023-12-01

## 2023-12-01 RX ORDER — DOXYCYCLINE HYCLATE 100 MG
100 TABLET ORAL 2 TIMES DAILY
Qty: 20 TABLET | Refills: 0 | Status: SHIPPED | OUTPATIENT
Start: 2023-12-01 | End: 2023-12-11

## 2023-12-01 RX ORDER — LENALIDOMIDE 10 MG/1
CAPSULE ORAL
COMMUNITY
Start: 2023-11-16

## 2023-12-01 RX ORDER — ALBUTEROL SULFATE 90 UG/1
2 AEROSOL, METERED RESPIRATORY (INHALATION) 4 TIMES DAILY PRN
Qty: 18 G | Refills: 0 | Status: SHIPPED | OUTPATIENT
Start: 2023-12-01

## 2023-12-01 RX ORDER — BENZONATATE 100 MG/1
100 CAPSULE ORAL 3 TIMES DAILY PRN
Qty: 21 CAPSULE | Refills: 0 | Status: SHIPPED | OUTPATIENT
Start: 2023-12-01 | End: 2023-12-08

## 2023-12-01 NOTE — PROGRESS NOTES
2021 HISTORY: ORDERING SYSTEM PROVIDED HISTORY: Cough, unspecified type TECHNOLOGIST PROVIDED HISTORY: Reason for exam:->cough, RSV FINDINGS: The lungs are without acute focal process. There is no effusion or pneumothorax. The cardiomediastinal silhouette is without acute process. The osseous structures are without acute process. There are old left rib fractures. No acute process. Medical Decision Making:     Vital signs reviewed    Past medical history reviewed. Allergies reviewed. Medications reviewed. Patient on arrival does not appear to be in any apparent distress or discomfort. The patient has been seen and evaluated. The patient does not appear to be toxic or lethargic. The patient did have RSV, influenza and COVID obtained. RSV positive    The patient was sent for chest x-ray that did not show any evidence of acute process. We discussed differential diagnosis with the patient and with daughter. Will treat the patient with prednisone, doxycycline, Tessalon Perles and albuterol inhaler. We discussed the rationale the symptoms have been ongoing for 2 months. The patient has been increasingly congested. The patient will have close follow-up with PCP. We discussed that the antibiotics would not cover RSV but the patient has had this cough and congestion that have been ongoing for longer than the short-term. The patient's repeat vital signs do show improvement of the heart rate. No pulse ox is 94%. The patient's previous was 95%. This seems to be about at her baseline over the last several epic pulse oxes. The patient was educated on the proper dosage of motrin and tylenol and the appropriate intervals of each. The patient is to increase fluid intake over the next several days. The patient is to use OTC decongestant as needed. The patient is to return to express care or go directly to the emergency department should any of the signs or symptoms worsen.  The

## 2024-01-05 ENCOUNTER — OFFICE VISIT (OUTPATIENT)
Dept: PRIMARY CARE CLINIC | Age: 64
End: 2024-01-05
Payer: COMMERCIAL

## 2024-01-05 VITALS
OXYGEN SATURATION: 98 % | TEMPERATURE: 97.1 F | HEART RATE: 98 BPM | DIASTOLIC BLOOD PRESSURE: 58 MMHG | HEIGHT: 66 IN | BODY MASS INDEX: 32.95 KG/M2 | SYSTOLIC BLOOD PRESSURE: 102 MMHG | WEIGHT: 205 LBS

## 2024-01-05 DIAGNOSIS — Z94.81 S/P AUTOLOGOUS BONE MARROW TRANSPLANTATION (HCC): ICD-10-CM

## 2024-01-05 DIAGNOSIS — D61.810 PANCYTOPENIA DUE TO CHEMOTHERAPY (HCC): ICD-10-CM

## 2024-01-05 DIAGNOSIS — C90.01 MULTIPLE MYELOMA IN REMISSION (HCC): ICD-10-CM

## 2024-01-05 DIAGNOSIS — E74.39 GLUCOSE INTOLERANCE: Primary | ICD-10-CM

## 2024-01-05 DIAGNOSIS — M17.0 PRIMARY OSTEOARTHRITIS OF BOTH KNEES: ICD-10-CM

## 2024-01-05 DIAGNOSIS — G31.84 MILD COGNITIVE IMPAIRMENT: ICD-10-CM

## 2024-01-05 PROCEDURE — 1123F ACP DISCUSS/DSCN MKR DOCD: CPT | Performed by: INTERNAL MEDICINE

## 2024-01-05 PROCEDURE — 99214 OFFICE O/P EST MOD 30 MIN: CPT | Performed by: INTERNAL MEDICINE

## 2024-01-05 PROCEDURE — 3078F DIAST BP <80 MM HG: CPT | Performed by: INTERNAL MEDICINE

## 2024-01-05 PROCEDURE — 3074F SYST BP LT 130 MM HG: CPT | Performed by: INTERNAL MEDICINE

## 2024-01-05 RX ORDER — DONEPEZIL HYDROCHLORIDE 5 MG/1
5 TABLET, FILM COATED ORAL NIGHTLY
Qty: 90 TABLET | Refills: 0 | Status: SHIPPED | OUTPATIENT
Start: 2024-01-05

## 2024-01-05 ASSESSMENT — PATIENT HEALTH QUESTIONNAIRE - PHQ9
SUM OF ALL RESPONSES TO PHQ QUESTIONS 1-9: 0
1. LITTLE INTEREST OR PLEASURE IN DOING THINGS: 0
2. FEELING DOWN, DEPRESSED OR HOPELESS: 0
SUM OF ALL RESPONSES TO PHQ9 QUESTIONS 1 & 2: 0
SUM OF ALL RESPONSES TO PHQ QUESTIONS 1-9: 0

## 2024-01-05 ASSESSMENT — ENCOUNTER SYMPTOMS
RHINORRHEA: 0
COUGH: 1
ABDOMINAL PAIN: 0
CONSTIPATION: 1
NAUSEA: 0
SORE THROAT: 0
BACK PAIN: 0
BLOOD IN STOOL: 0
ABDOMINAL DISTENTION: 0
COLOR CHANGE: 0
ALLERGIC/IMMUNOLOGIC NEGATIVE: 1
TROUBLE SWALLOWING: 0
DIARRHEA: 0
VOMITING: 0
SINUS PRESSURE: 0

## 2024-01-05 NOTE — PROGRESS NOTES
normal. There is no impacted cerumen.      Nose: Nose normal.      Mouth/Throat:      Pharynx: Oropharynx is clear. No oropharyngeal exudate.   Eyes:      Extraocular Movements: Extraocular movements intact.      Conjunctiva/sclera: Conjunctivae normal.      Pupils: Pupils are equal, round, and reactive to light.   Cardiovascular:      Rate and Rhythm: Normal rate and regular rhythm.      Heart sounds: No murmur heard.     No friction rub. No gallop.   Pulmonary:      Effort: Pulmonary effort is normal.      Breath sounds: Normal breath sounds.   Abdominal:      General: Bowel sounds are normal. There is no distension.      Palpations: Abdomen is soft. There is no mass.      Tenderness: There is no abdominal tenderness. There is no guarding or rebound.   Musculoskeletal:         General: No swelling, tenderness or deformity. Normal range of motion.      Cervical back: Normal range of motion and neck supple. No tenderness.      Comments: Limited flexion and extension rt knee without significant swelling.    Lymphadenopathy:      Cervical: No cervical adenopathy.   Skin:     General: Skin is warm.      Coloration: Skin is not pale.      Findings: No rash.   Neurological:      General: No focal deficit present.      Mental Status: She is alert and oriented to person, place, and time.          Assessment:  Diagnoses and all orders for this visit:    Glucose intolerance  Comments:  A1C at 5.9, cont low carb/sugar diet    Multiple myeloma in remission ([facility])  Comments:  SP Bone marrow transplant, doing well.    S/P autologous bone marrow transplantation (HCC)  Comments:  Progressing well, follows upat oncology    Pancytopenia due to chemotherapy ([facility])  Comments:  Follows up at the oncologist    Mild cognitive impairment  Comments:  Discussed adjustments, stay mentally challenged and socially connected.    Primary osteoarthritis of both knees  Comments:  PRN TYlenol and hot packs    Other orders  -     donepezil (ARICEPT) 5

## 2024-01-17 LAB
P E INTERPRETATION, U: NORMAL
PATHOLOGIST: NORMAL
SPECIMEN TYPE: NORMAL
TOTAL PROTEIN EXCRETED, URINE: 76 MG/24 H (ref 30–150)

## 2024-02-26 ENCOUNTER — COMMUNITY OUTREACH (OUTPATIENT)
Dept: PRIMARY CARE CLINIC | Age: 64
End: 2024-02-26

## 2024-03-22 ENCOUNTER — OFFICE VISIT (OUTPATIENT)
Dept: PRIMARY CARE CLINIC | Age: 64
End: 2024-03-22
Payer: COMMERCIAL

## 2024-03-22 VITALS
TEMPERATURE: 97.3 F | DIASTOLIC BLOOD PRESSURE: 66 MMHG | BODY MASS INDEX: 32.78 KG/M2 | HEIGHT: 66 IN | SYSTOLIC BLOOD PRESSURE: 110 MMHG | HEART RATE: 77 BPM | OXYGEN SATURATION: 98 % | WEIGHT: 204 LBS

## 2024-03-22 DIAGNOSIS — C90.01 MULTIPLE MYELOMA IN REMISSION (HCC): ICD-10-CM

## 2024-03-22 DIAGNOSIS — G31.84 MILD COGNITIVE IMPAIRMENT: ICD-10-CM

## 2024-03-22 DIAGNOSIS — E74.39 GLUCOSE INTOLERANCE: Primary | ICD-10-CM

## 2024-03-22 PROCEDURE — 1123F ACP DISCUSS/DSCN MKR DOCD: CPT | Performed by: INTERNAL MEDICINE

## 2024-03-22 PROCEDURE — 3074F SYST BP LT 130 MM HG: CPT | Performed by: INTERNAL MEDICINE

## 2024-03-22 PROCEDURE — 3078F DIAST BP <80 MM HG: CPT | Performed by: INTERNAL MEDICINE

## 2024-03-22 PROCEDURE — 99214 OFFICE O/P EST MOD 30 MIN: CPT | Performed by: INTERNAL MEDICINE

## 2024-03-22 RX ORDER — ASPIRIN 325 MG
325 TABLET ORAL DAILY
COMMUNITY

## 2024-03-22 SDOH — ECONOMIC STABILITY: FOOD INSECURITY: WITHIN THE PAST 12 MONTHS, YOU WORRIED THAT YOUR FOOD WOULD RUN OUT BEFORE YOU GOT MONEY TO BUY MORE.: NEVER TRUE

## 2024-03-22 SDOH — ECONOMIC STABILITY: FOOD INSECURITY: WITHIN THE PAST 12 MONTHS, THE FOOD YOU BOUGHT JUST DIDN'T LAST AND YOU DIDN'T HAVE MONEY TO GET MORE.: NEVER TRUE

## 2024-03-22 SDOH — ECONOMIC STABILITY: INCOME INSECURITY: HOW HARD IS IT FOR YOU TO PAY FOR THE VERY BASICS LIKE FOOD, HOUSING, MEDICAL CARE, AND HEATING?: NOT HARD AT ALL

## 2024-03-22 NOTE — PROGRESS NOTES
Selma Campos presents today for   Follow up of multiple myeloma,   Current Outpatient Medications   Medication Sig Dispense Refill    aspirin 325 MG tablet Take 1 tablet by mouth daily      REVLIMID 10 MG chemo capsule       albuterol sulfate HFA (PROVENTIL;VENTOLIN;PROAIR) 108 (90 Base) MCG/ACT inhaler Inhale 2 puffs into the lungs 4 times daily as needed for Wheezing (Patient not taking: Reported on 3/22/2024) 18 g 0     No current facility-administered medications for this visit.      Past Medical History:   Diagnosis Date    Arthritis of back     COVID     COVID-19 5/13/2021    DJD (degenerative joint disease)     Hand pain     High cholesterol 6/27/2023    Hypertension     Knee pain, right     Monoclonal gammopathy     Multiple myeloma in remission (HCC) 10/21/2022    OAB (overactive bladder)     Obesity     Pneumonia     Ventral hernia           Subjective:  AS above. Continues chemotherapy for Multiple myeloma under the care of DR Ovalles.  Tolerating well.  Energy level is pretty good, has continued to work on a part time basis. No sxs of infection        Review of Systems   Constitutional:  Negative for activity change, appetite change and chills.   HENT:  Negative for congestion, ear pain, mouth sores, postnasal drip, rhinorrhea, sinus pressure, sneezing, sore throat and trouble swallowing.    Eyes:  Negative for visual disturbance.   Cardiovascular:  Negative for chest pain, palpitations and leg swelling.   Gastrointestinal:  Negative for abdominal distention, abdominal pain, blood in stool, constipation, diarrhea, nausea and vomiting.   Endocrine: Negative for cold intolerance, heat intolerance, polydipsia and polyuria.   Genitourinary:  Negative for difficulty urinating, dysuria, flank pain, frequency and urgency.   Musculoskeletal:  Negative for arthralgias, back pain, gait problem, neck pain and neck stiffness.   Skin: Negative.  Negative for color change.   Allergic/Immunologic: Negative.

## 2024-03-25 PROBLEM — R19.7 DIARRHEA: Status: RESOLVED | Noted: 2023-05-28 | Resolved: 2024-03-25

## 2024-03-25 ASSESSMENT — ENCOUNTER SYMPTOMS
CONSTIPATION: 0
ALLERGIC/IMMUNOLOGIC NEGATIVE: 1
SINUS PRESSURE: 0
SORE THROAT: 0
BLOOD IN STOOL: 0
BACK PAIN: 0
NAUSEA: 0
ABDOMINAL PAIN: 0
ABDOMINAL DISTENTION: 0
VOMITING: 0
COLOR CHANGE: 0
TROUBLE SWALLOWING: 0
DIARRHEA: 0
RHINORRHEA: 0

## 2024-04-15 LAB
P E INTERPRETATION, U: NORMAL
PATHOLOGIST: NORMAL
SPECIMEN TYPE: NORMAL
TOTAL PROTEIN EXCRETED, URINE: 114 MG/24 H (ref 30–150)

## 2024-04-16 LAB — MAMMOGRAPHY, EXTERNAL: NORMAL

## 2024-06-21 ENCOUNTER — OFFICE VISIT (OUTPATIENT)
Dept: PRIMARY CARE CLINIC | Age: 64
End: 2024-06-21
Payer: COMMERCIAL

## 2024-06-21 VITALS
DIASTOLIC BLOOD PRESSURE: 68 MMHG | SYSTOLIC BLOOD PRESSURE: 110 MMHG | OXYGEN SATURATION: 98 % | WEIGHT: 201 LBS | HEART RATE: 76 BPM | BODY MASS INDEX: 32.3 KG/M2 | HEIGHT: 66 IN | TEMPERATURE: 97.1 F

## 2024-06-21 DIAGNOSIS — E66.9 OBESITY (BMI 35.0-39.9 WITHOUT COMORBIDITY): ICD-10-CM

## 2024-06-21 DIAGNOSIS — C90.01 MULTIPLE MYELOMA IN REMISSION (HCC): ICD-10-CM

## 2024-06-21 DIAGNOSIS — M25.361 INSTABILITY OF RIGHT KNEE JOINT: ICD-10-CM

## 2024-06-21 DIAGNOSIS — I10 PRIMARY HYPERTENSION: Primary | ICD-10-CM

## 2024-06-21 DIAGNOSIS — E78.00 HIGH CHOLESTEROL: ICD-10-CM

## 2024-06-21 DIAGNOSIS — E74.39 GLUCOSE INTOLERANCE: ICD-10-CM

## 2024-06-21 PROCEDURE — 3078F DIAST BP <80 MM HG: CPT | Performed by: INTERNAL MEDICINE

## 2024-06-21 PROCEDURE — 3074F SYST BP LT 130 MM HG: CPT | Performed by: INTERNAL MEDICINE

## 2024-06-21 PROCEDURE — 1123F ACP DISCUSS/DSCN MKR DOCD: CPT | Performed by: INTERNAL MEDICINE

## 2024-06-21 PROCEDURE — 99214 OFFICE O/P EST MOD 30 MIN: CPT | Performed by: INTERNAL MEDICINE

## 2024-06-21 NOTE — PROGRESS NOTES
Selma Campos presents today for for follow up of Multiple Myeloma, complaining of some back pain and pain on knees.     Current Outpatient Medications   Medication Sig Dispense Refill    REVLIMID 10 MG chemo capsule        No current facility-administered medications for this visit.      Past Medical History:   Diagnosis Date    Arthritis of back     COVID     COVID-19 5/13/2021    DJD (degenerative joint disease)     Hand pain     High cholesterol 6/27/2023    Hypertension     Knee pain, right     Monoclonal gammopathy     Multiple myeloma in remission (HCC) 10/21/2022    OAB (overactive bladder)     Obesity     Pneumonia     Ventral hernia           Subjective:  AS above.  Overall doing very well.  Oc  cough and chest congestion, occ knee pain. Continues chemo with DR Ovalles, tolerating well. Good appetite, sleeps well.        Review of Systems   Musculoskeletal:  Positive for arthralgias (rt knee pain).          Objective:  /68 (Site: Left Upper Arm, Position: Sitting, Cuff Size: Large Adult)   Pulse 76   Temp 97.1 °F (36.2 °C)   Ht 1.676 m (5' 6\")   Wt 91.2 kg (201 lb)   SpO2 98%   BMI 32.44 kg/m²      Physical Exam  Vitals reviewed.   Constitutional:       Appearance: She is obese.      Comments: Ambulates without a cane   HENT:      Head: Normocephalic.      Right Ear: External ear normal. There is no impacted cerumen.      Left Ear: External ear normal. There is no impacted cerumen.      Nose: Nose normal.      Mouth/Throat:      Pharynx: Oropharynx is clear. No oropharyngeal exudate.   Eyes:      Extraocular Movements: Extraocular movements intact.      Conjunctiva/sclera: Conjunctivae normal.      Pupils: Pupils are equal, round, and reactive to light.   Cardiovascular:      Rate and Rhythm: Normal rate and regular rhythm.      Heart sounds: No murmur heard.     No friction rub. No gallop.   Pulmonary:      Effort: Pulmonary effort is normal.      Breath sounds: Normal breath sounds.

## 2024-07-09 LAB
P E INTERPRETATION, U: NORMAL
PATHOLOGIST: NORMAL
SPECIMEN TYPE: NORMAL
TOTAL PROTEIN EXCRETED, URINE: 68 MG/24 H (ref 0–148)

## 2024-08-05 ENCOUNTER — TELEPHONE (OUTPATIENT)
Dept: PRIMARY CARE CLINIC | Age: 64
End: 2024-08-05

## 2024-08-05 NOTE — TELEPHONE ENCOUNTER
Pt called in to make a same day appointment with Dr. Turner. I explained to her the doctor is not in the office on Mondays and the earliest I would be able to get her in would be 8.13.24. pt missed all last week of work.    She states she is having drainage, coughing and flu like symptoms that started last Monday. Covid test came back negative.     Am I able to add her on the schedule for tomorrow morning? Pt also wants to know if she can have a doctors note for the days she missed work?    Call pt back at [phone]

## 2024-08-06 ENCOUNTER — OFFICE VISIT (OUTPATIENT)
Dept: PRIMARY CARE CLINIC | Age: 64
End: 2024-08-06
Payer: COMMERCIAL

## 2024-08-06 VITALS
TEMPERATURE: 97.5 F | BODY MASS INDEX: 32.14 KG/M2 | WEIGHT: 200 LBS | OXYGEN SATURATION: 98 % | DIASTOLIC BLOOD PRESSURE: 58 MMHG | SYSTOLIC BLOOD PRESSURE: 92 MMHG | HEIGHT: 66 IN | HEART RATE: 73 BPM

## 2024-08-06 DIAGNOSIS — U07.1 COVID-19: ICD-10-CM

## 2024-08-06 DIAGNOSIS — R05.1 ACUTE COUGH: Primary | ICD-10-CM

## 2024-08-06 LAB
INFLUENZA A ANTIGEN, POC: NEGATIVE
INFLUENZA B ANTIGEN, POC: NEGATIVE
LOT EXPIRE DATE: ABNORMAL
LOT KIT NUMBER: ABNORMAL
SARS-COV-2, POC: DETECTED
VALID INTERNAL CONTROL: ABNORMAL
VENDOR AND KIT NAME POC: ABNORMAL

## 2024-08-06 PROCEDURE — 3074F SYST BP LT 130 MM HG: CPT | Performed by: INTERNAL MEDICINE

## 2024-08-06 PROCEDURE — 3078F DIAST BP <80 MM HG: CPT | Performed by: INTERNAL MEDICINE

## 2024-08-06 PROCEDURE — 99214 OFFICE O/P EST MOD 30 MIN: CPT | Performed by: INTERNAL MEDICINE

## 2024-08-06 PROCEDURE — 87428 SARSCOV & INF VIR A&B AG IA: CPT | Performed by: INTERNAL MEDICINE

## 2024-08-06 PROCEDURE — 1123F ACP DISCUSS/DSCN MKR DOCD: CPT | Performed by: INTERNAL MEDICINE

## 2024-08-06 RX ORDER — NIRMATRELVIR AND RITONAVIR 150-100 MG
KIT ORAL
Qty: 20 TABLET | Refills: 0 | Status: SHIPPED | OUTPATIENT
Start: 2024-08-06 | End: 2024-08-11

## 2024-08-06 ASSESSMENT — ENCOUNTER SYMPTOMS
VOMITING: 0
NAUSEA: 0
SINUS PRESSURE: 0
TROUBLE SWALLOWING: 0
SORE THROAT: 0
COUGH: 1
BLOOD IN STOOL: 0
CONSTIPATION: 0
ABDOMINAL PAIN: 0
COLOR CHANGE: 0
DIARRHEA: 0
RHINORRHEA: 0
ALLERGIC/IMMUNOLOGIC NEGATIVE: 1
BACK PAIN: 0
ABDOMINAL DISTENTION: 0

## 2024-08-06 NOTE — PROGRESS NOTES
Selma Campos presents today for   Cough, chest and head congestion and general malaise for last 5 days. Missed 3 days of work last week, Went back to work today. No fever or sob.   Current Outpatient Medications   Medication Sig Dispense Refill    nirmatrelvir/ritonavir (PAXLOVID, 150/100,) 10 x 150 MG & 10 x 100MG TBPK Take 2 tablets (one 150 mg nirmatrelvir and one 100 mg ritonavir tablets) by mouth every 12 hours for 5 days. 20 tablet 0    REVLIMID 10 MG chemo capsule        No current facility-administered medications for this visit.      Past Medical History:   Diagnosis Date    Arthritis of back     COVID     COVID-19 5/13/2021    DJD (degenerative joint disease)     Hand pain     High cholesterol 6/27/2023    Hypertension     Knee pain, right     Monoclonal gammopathy     Multiple myeloma in remission (HCC) 10/21/2022    OAB (overactive bladder)     Obesity     Pneumonia     Ventral hernia           Subjective:  Sick for 5 days, cough, chest congestion, malaise, no fever or sob.  Missed 3 days of work last week, Finally went back today.     Cough  Pertinent negatives include no chest pain, chills, ear pain, headaches, postnasal drip, rhinorrhea or sore throat.      Review of Systems   Constitutional:  Negative for activity change, appetite change and chills.   HENT:  Positive for congestion. Negative for ear pain, mouth sores, postnasal drip, rhinorrhea, sinus pressure, sneezing, sore throat and trouble swallowing.    Eyes:  Negative for visual disturbance.   Respiratory:  Positive for cough.    Cardiovascular:  Negative for chest pain, palpitations and leg swelling.   Gastrointestinal:  Negative for abdominal distention, abdominal pain, blood in stool, constipation, diarrhea, nausea and vomiting.   Endocrine: Negative for cold intolerance, heat intolerance, polydipsia and polyuria.   Genitourinary:  Negative for difficulty urinating, dysuria, flank pain, frequency and urgency.   Musculoskeletal:

## 2024-09-20 ENCOUNTER — OFFICE VISIT (OUTPATIENT)
Dept: PRIMARY CARE CLINIC | Age: 64
End: 2024-09-20
Payer: COMMERCIAL

## 2024-09-20 VITALS
DIASTOLIC BLOOD PRESSURE: 78 MMHG | SYSTOLIC BLOOD PRESSURE: 102 MMHG | OXYGEN SATURATION: 95 % | HEART RATE: 64 BPM | TEMPERATURE: 97.9 F | HEIGHT: 66 IN | BODY MASS INDEX: 32.14 KG/M2 | WEIGHT: 200 LBS

## 2024-09-20 DIAGNOSIS — E74.39 GLUCOSE INTOLERANCE: ICD-10-CM

## 2024-09-20 DIAGNOSIS — R42 VERTIGO: ICD-10-CM

## 2024-09-20 DIAGNOSIS — C90.01 MULTIPLE MYELOMA IN REMISSION (HCC): ICD-10-CM

## 2024-09-20 DIAGNOSIS — E78.5 SERUM LIPIDS HIGH: Primary | ICD-10-CM

## 2024-09-20 LAB
ALBUMIN: 3.8 G/DL (ref 3.5–5.2)
ALP BLD-CCNC: 67 U/L (ref 35–104)
ALT SERPL-CCNC: 6 U/L (ref 0–32)
ANION GAP SERPL CALCULATED.3IONS-SCNC: 12 MMOL/L (ref 7–16)
AST SERPL-CCNC: 18 U/L (ref 0–31)
BILIRUB SERPL-MCNC: 0.6 MG/DL (ref 0–1.2)
BUN BLDV-MCNC: 13 MG/DL (ref 6–23)
CALCIUM SERPL-MCNC: 8.8 MG/DL (ref 8.6–10.2)
CHLORIDE BLD-SCNC: 108 MMOL/L (ref 98–107)
CHOLESTEROL, TOTAL: 188 MG/DL
CO2: 23 MMOL/L (ref 22–29)
CREAT SERPL-MCNC: 0.8 MG/DL (ref 0.5–1)
GFR, ESTIMATED: 84 ML/MIN/1.73M2
GLUCOSE BLD-MCNC: 93 MG/DL (ref 74–99)
HBA1C MFR BLD: 5.6 % (ref 4–5.6)
HDLC SERPL-MCNC: 46 MG/DL
LDL CHOLESTEROL: 116 MG/DL
POTASSIUM SERPL-SCNC: 4.1 MMOL/L (ref 3.5–5)
SODIUM BLD-SCNC: 143 MMOL/L (ref 132–146)
TOTAL PROTEIN: 7.2 G/DL (ref 6.4–8.3)
TRIGL SERPL-MCNC: 132 MG/DL
VLDLC SERPL CALC-MCNC: 26 MG/DL

## 2024-09-20 PROCEDURE — 3074F SYST BP LT 130 MM HG: CPT | Performed by: INTERNAL MEDICINE

## 2024-09-20 PROCEDURE — 1123F ACP DISCUSS/DSCN MKR DOCD: CPT | Performed by: INTERNAL MEDICINE

## 2024-09-20 PROCEDURE — 99214 OFFICE O/P EST MOD 30 MIN: CPT | Performed by: INTERNAL MEDICINE

## 2024-09-20 PROCEDURE — 3078F DIAST BP <80 MM HG: CPT | Performed by: INTERNAL MEDICINE

## 2024-09-20 RX ORDER — MECLIZINE HCL 12.5 MG 12.5 MG/1
12.5 TABLET ORAL 3 TIMES DAILY PRN
Qty: 30 TABLET | Refills: 0 | Status: SHIPPED | OUTPATIENT
Start: 2024-09-20 | End: 2024-09-30

## 2024-09-20 ASSESSMENT — ENCOUNTER SYMPTOMS
SORE THROAT: 0
NAUSEA: 0
ALLERGIC/IMMUNOLOGIC NEGATIVE: 1
COLOR CHANGE: 0
ABDOMINAL PAIN: 0
RHINORRHEA: 0
ABDOMINAL DISTENTION: 0
SINUS PRESSURE: 0
BLOOD IN STOOL: 0
TROUBLE SWALLOWING: 0
VOMITING: 0
BACK PAIN: 0
CONSTIPATION: 0

## 2024-09-25 LAB
P E INTERPRETATION, U: NORMAL
PATHOLOGIST: NORMAL
SPECIMEN TYPE: NORMAL
TOTAL PROTEIN EXCRETED, URINE: 101 MG/24 H (ref 30–150)

## 2024-12-06 ENCOUNTER — OFFICE VISIT (OUTPATIENT)
Dept: PRIMARY CARE CLINIC | Age: 64
End: 2024-12-06

## 2024-12-06 VITALS
DIASTOLIC BLOOD PRESSURE: 82 MMHG | WEIGHT: 203 LBS | HEIGHT: 66 IN | BODY MASS INDEX: 32.62 KG/M2 | RESPIRATION RATE: 16 BRPM | TEMPERATURE: 97.6 F | HEART RATE: 63 BPM | SYSTOLIC BLOOD PRESSURE: 130 MMHG | OXYGEN SATURATION: 98 %

## 2024-12-06 DIAGNOSIS — I10 PRIMARY HYPERTENSION: Primary | ICD-10-CM

## 2024-12-06 DIAGNOSIS — E74.39 GLUCOSE INTOLERANCE: ICD-10-CM

## 2024-12-06 DIAGNOSIS — E78.00 HIGH CHOLESTEROL: ICD-10-CM

## 2024-12-06 DIAGNOSIS — G31.84 MILD COGNITIVE IMPAIRMENT: ICD-10-CM

## 2024-12-06 DIAGNOSIS — C90.01 MULTIPLE MYELOMA IN REMISSION (HCC): ICD-10-CM

## 2024-12-06 ASSESSMENT — ENCOUNTER SYMPTOMS
CONSTIPATION: 0
ALLERGIC/IMMUNOLOGIC NEGATIVE: 1
TROUBLE SWALLOWING: 0
NAUSEA: 0
RHINORRHEA: 0
BLOOD IN STOOL: 0
ABDOMINAL PAIN: 0
SINUS PRESSURE: 0
SORE THROAT: 0
DIARRHEA: 0
ABDOMINAL DISTENTION: 0
BACK PAIN: 0
COLOR CHANGE: 0
VOMITING: 0

## 2024-12-06 NOTE — PROGRESS NOTES
Selma Capmos presents today for follow up of Multiple Myeloma, Obesity, Hx HTN and Glucose Intolerance    Current Outpatient Medications   Medication Sig Dispense Refill    REVLIMID 10 MG chemo capsule        No current facility-administered medications for this visit.      Past Medical History:   Diagnosis Date    Arthritis of back     COVID     COVID-19 5/13/2021    DJD (degenerative joint disease)     Hand pain     High cholesterol 6/27/2023    Hypertension     Knee pain, right     Monoclonal gammopathy     Multiple myeloma in remission (HCC) 10/21/2022    OAB (overactive bladder)     Obesity     Pneumonia     Ventral hernia           Subjective:  AS above.  Is getting forgetful, looses things. Had labs on last apt, wants to discuss reports. Continues on remission regarding her MM, on po meds, follows up with Dr Ovalles. Continues to work full time, overall feels pretty good.        Review of Systems   Constitutional:  Negative for activity change, appetite change and chills.   HENT:  Negative for congestion, ear pain, mouth sores, postnasal drip, rhinorrhea, sinus pressure, sneezing, sore throat and trouble swallowing.    Eyes:  Negative for visual disturbance.   Cardiovascular:  Negative for chest pain, palpitations and leg swelling.   Gastrointestinal:  Negative for abdominal distention, abdominal pain, blood in stool, constipation, diarrhea, nausea and vomiting.   Endocrine: Negative for cold intolerance, heat intolerance, polydipsia and polyuria.   Genitourinary:  Negative for difficulty urinating, dysuria, flank pain, frequency and urgency.   Musculoskeletal:  Positive for arthralgias. Negative for back pain, gait problem, neck pain and neck stiffness.   Skin: Negative.  Negative for color change.   Allergic/Immunologic: Negative.    Neurological:  Negative for dizziness, tremors, speech difficulty, weakness, light-headedness and headaches.   Hematological: Negative.    Psychiatric/Behavioral:

## 2025-01-17 LAB
P E INTERPRETATION, U: NORMAL
PATHOLOGIST: NORMAL
SPECIMEN TYPE: NORMAL
TOTAL PROTEIN EXCRETED, URINE: 104 MG/24 H (ref 30–150)

## 2025-04-11 LAB
P E INTERPRETATION, U: NORMAL
PATHOLOGIST: NORMAL
SPECIMEN TYPE: NORMAL
TOTAL PROTEIN EXCRETED, URINE: 97 MG/24 H (ref 30–150)

## 2025-05-07 LAB
P E INTERPRETATION, U: ABNORMAL
PATHOLOGIST: ABNORMAL
SPECIMEN TYPE: ABNORMAL
TOTAL PROTEIN EXCRETED, URINE: 164 MG/24 H (ref 30–150)

## 2025-05-12 ENCOUNTER — TELEPHONE (OUTPATIENT)
Dept: PRIMARY CARE CLINIC | Age: 65
End: 2025-05-12

## 2025-05-12 NOTE — TELEPHONE ENCOUNTER
Received a call from [facility] wanting to let Dr. Turner know that a health risk assessment and a care plan are available int the provider portal with [facility]. Thank you.

## 2025-08-06 LAB
P E INTERPRETATION, U: NORMAL
PATHOLOGIST: NORMAL
SPECIMEN TYPE: NORMAL
TOTAL PROTEIN EXCRETED, URINE: 93 MG/24 H (ref 30–150)

## 2025-08-27 ENCOUNTER — OFFICE VISIT (OUTPATIENT)
Dept: PRIMARY CARE CLINIC | Age: 65
End: 2025-08-27

## 2025-08-27 VITALS
DIASTOLIC BLOOD PRESSURE: 77 MMHG | TEMPERATURE: 98.6 F | WEIGHT: 208 LBS | SYSTOLIC BLOOD PRESSURE: 112 MMHG | OXYGEN SATURATION: 98 % | BODY MASS INDEX: 33.43 KG/M2 | RESPIRATION RATE: 17 BRPM | HEIGHT: 66 IN | HEART RATE: 79 BPM

## 2025-08-27 DIAGNOSIS — E78.2 MIXED HYPERLIPIDEMIA: ICD-10-CM

## 2025-08-27 DIAGNOSIS — R73.03 PREDIABETES: ICD-10-CM

## 2025-08-27 DIAGNOSIS — C90.01 MULTIPLE MYELOMA IN REMISSION (HCC): ICD-10-CM

## 2025-08-27 DIAGNOSIS — E55.9 VITAMIN D DEFICIENCY: ICD-10-CM

## 2025-08-27 DIAGNOSIS — G89.29 CHRONIC PAIN OF RIGHT KNEE: ICD-10-CM

## 2025-08-27 DIAGNOSIS — M25.561 CHRONIC PAIN OF RIGHT KNEE: ICD-10-CM

## 2025-08-27 DIAGNOSIS — G31.84 MILD COGNITIVE IMPAIRMENT: ICD-10-CM

## 2025-08-27 DIAGNOSIS — Z12.31 SCREENING MAMMOGRAM FOR BREAST CANCER: ICD-10-CM

## 2025-08-27 DIAGNOSIS — Z12.11 ENCOUNTER FOR SCREENING COLONOSCOPY: ICD-10-CM

## 2025-08-27 DIAGNOSIS — I10 PRIMARY HYPERTENSION: Primary | ICD-10-CM

## 2025-08-27 DIAGNOSIS — Z94.81 S/P AUTOLOGOUS BONE MARROW TRANSPLANTATION (HCC): ICD-10-CM

## 2025-08-27 SDOH — ECONOMIC STABILITY: FOOD INSECURITY: WITHIN THE PAST 12 MONTHS, THE FOOD YOU BOUGHT JUST DIDN'T LAST AND YOU DIDN'T HAVE MONEY TO GET MORE.: NEVER TRUE

## 2025-08-27 SDOH — ECONOMIC STABILITY: FOOD INSECURITY: WITHIN THE PAST 12 MONTHS, YOU WORRIED THAT YOUR FOOD WOULD RUN OUT BEFORE YOU GOT MONEY TO BUY MORE.: NEVER TRUE

## 2025-08-27 ASSESSMENT — PATIENT HEALTH QUESTIONNAIRE - PHQ9
SUM OF ALL RESPONSES TO PHQ QUESTIONS 1-9: 0
SUM OF ALL RESPONSES TO PHQ QUESTIONS 1-9: 0
1. LITTLE INTEREST OR PLEASURE IN DOING THINGS: NOT AT ALL
2. FEELING DOWN, DEPRESSED OR HOPELESS: NOT AT ALL
SUM OF ALL RESPONSES TO PHQ QUESTIONS 1-9: 0
SUM OF ALL RESPONSES TO PHQ QUESTIONS 1-9: 0

## 2025-08-28 ENCOUNTER — LAB (OUTPATIENT)
Dept: PRIMARY CARE CLINIC | Age: 65
End: 2025-08-28
Payer: MEDICARE

## 2025-08-28 DIAGNOSIS — G31.84 MILD COGNITIVE IMPAIRMENT: ICD-10-CM

## 2025-08-28 DIAGNOSIS — E55.9 VITAMIN D DEFICIENCY: ICD-10-CM

## 2025-08-28 DIAGNOSIS — R73.03 PREDIABETES: ICD-10-CM

## 2025-08-28 DIAGNOSIS — E78.2 MIXED HYPERLIPIDEMIA: ICD-10-CM

## 2025-08-28 DIAGNOSIS — C90.01 MULTIPLE MYELOMA IN REMISSION (HCC): ICD-10-CM

## 2025-08-28 LAB
ALBUMIN: 3.7 G/DL (ref 3.5–5.2)
ALP BLD-CCNC: 77 U/L (ref 35–104)
ALT SERPL-CCNC: 11 U/L (ref 0–35)
ANION GAP SERPL CALCULATED.3IONS-SCNC: 11 MMOL/L (ref 7–16)
AST SERPL-CCNC: 17 U/L (ref 0–35)
BILIRUB SERPL-MCNC: 0.5 MG/DL (ref 0–1.2)
BUN BLDV-MCNC: 16 MG/DL (ref 8–23)
CALCIUM SERPL-MCNC: 9 MG/DL (ref 8.8–10.2)
CHLORIDE BLD-SCNC: 104 MMOL/L (ref 98–107)
CHOLESTEROL, TOTAL: 199 MG/DL
CO2: 25 MMOL/L (ref 22–29)
CREAT SERPL-MCNC: 0.9 MG/DL (ref 0.5–1)
FOLATE: 13.1 NG/ML (ref 4.6–34.8)
GFR, ESTIMATED: 74 ML/MIN/1.73M2
GLUCOSE BLD-MCNC: 91 MG/DL (ref 74–99)
HBA1C MFR BLD: 6 % (ref 4–5.6)
HDLC SERPL-MCNC: 50 MG/DL
LDL CHOLESTEROL: 114 MG/DL
POTASSIUM SERPL-SCNC: 4 MMOL/L (ref 3.5–5.1)
SODIUM BLD-SCNC: 141 MMOL/L (ref 136–145)
TOTAL PROTEIN: 7 G/DL (ref 6.4–8.3)
TRIGL SERPL-MCNC: 176 MG/DL
TSH SERPL DL<=0.05 MIU/L-ACNC: 2.03 UIU/ML (ref 0.27–4.2)
VITAMIN B-12: 170 PG/ML (ref 232–1245)
VITAMIN D 25-HYDROXY: 14.3 NG/ML (ref 30–100)
VLDLC SERPL CALC-MCNC: 35 MG/DL

## 2025-08-28 PROCEDURE — 99211 OFF/OP EST MAY X REQ PHY/QHP: CPT

## 2025-08-28 PROCEDURE — 36415 COLL VENOUS BLD VENIPUNCTURE: CPT

## 2025-08-29 RX ORDER — ERGOCALCIFEROL 1.25 MG/1
50000 CAPSULE, LIQUID FILLED ORAL WEEKLY
Qty: 12 CAPSULE | Refills: 1 | Status: SHIPPED | OUTPATIENT
Start: 2025-08-29

## 2025-09-03 ENCOUNTER — HOSPITAL ENCOUNTER (OUTPATIENT)
Dept: PHYSICAL THERAPY | Age: 65
Setting detail: THERAPIES SERIES
Discharge: HOME | End: 2025-09-03
Payer: MEDICARE

## 2025-09-03 PROCEDURE — 97163 PT EVAL HIGH COMPLEX 45 MIN: CPT | Performed by: PHYSICAL THERAPIST

## 2025-09-03 PROCEDURE — 97110 THERAPEUTIC EXERCISES: CPT | Performed by: PHYSICAL THERAPIST

## 2025-09-03 ASSESSMENT — ENCOUNTER SYMPTOMS
COLOR CHANGE: 0
ABDOMINAL PAIN: 0
BACK PAIN: 0
CONSTIPATION: 0
RHINORRHEA: 0
COUGH: 0
DIARRHEA: 0
VOMITING: 0
SHORTNESS OF BREATH: 0